# Patient Record
Sex: FEMALE | Race: WHITE | NOT HISPANIC OR LATINO | Employment: OTHER | URBAN - METROPOLITAN AREA
[De-identification: names, ages, dates, MRNs, and addresses within clinical notes are randomized per-mention and may not be internally consistent; named-entity substitution may affect disease eponyms.]

---

## 2017-06-16 ENCOUNTER — ALLSCRIPTS OFFICE VISIT (OUTPATIENT)
Dept: OTHER | Facility: OTHER | Age: 72
End: 2017-06-16

## 2017-12-15 ENCOUNTER — ALLSCRIPTS OFFICE VISIT (OUTPATIENT)
Dept: OTHER | Facility: OTHER | Age: 72
End: 2017-12-15

## 2017-12-15 DIAGNOSIS — M06.9 RHEUMATOID ARTHRITIS (HCC): ICD-10-CM

## 2017-12-30 NOTE — PROGRESS NOTES
Assessment   1  Postmenopausal vaginal bleeding (627 1) (N95 0)   2  Presence of pessary (V45 89) (Z92 89)   3  Rheumatoid arthritis (714 0) (M06 9)    Plan   Colon cancer screening    · (1) OCCULT BLOOD, FECAL IMMUNOCHEMICAL TEST; Status:Active; Requested    for:92Fkg6831; Health Maintenance    · Stretch and warm up your muscles during the first 10 minutes , then cool down your    muscles for the last 10 minutes of exercise ; Status:Complete;   Done: [id number]  Left wrist pain    · TraMADol HCl - 50 MG Oral Tablet; TAKE 1 TO 2 TABLETS 3 TIMES DAILY AS    NEEDED  Postmenopausal vaginal bleeding, Presence of pessary    · 1 - Nadeem RIVERA, Shantell Jones  (Obstetrics/Gynecology) Co-Management  *  Status: Active     Requested for: 02TLG1080  Care Summary provided  : Yes  Rheumatoid arthritis    · * DXA BONE DENSITY SPINE HIP AND PELVIS; Status:Active; Requested for:40Pqs7822;        Discussion/Summary      6-8 mo f/u  The patient was counseled regarding instructions for management,-- patient and family education,-- impressions  The treatment plan was reviewed with the patient/guardian  The patient/guardian understands and agrees with the treatment plan      Chief Complaint   Pt is here for a check up  History of Present Illness   here for scheduled OV, doing well saw ortho at [facility] for her wrist in september, supposed to be referred elsewhere, but they never got back to me      Review of Systems        Constitutional: No fever, no chills, feels well, no tiredness, no recent weight gain or weight loss  Cardiovascular: No complaints of slow heart rate, no fast heart rate, no chest pain, no palpitations, no leg claudication, no lower extremity edema  Respiratory: No complaints of shortness of breath, no wheezing, no cough, no SOB on exertion, no orthopnea, no PND  Gastrointestinal: No complaints of abdominal pain, no constipation, no nausea or vomiting, no diarrhea, no bloody stools        Genitourinary: today states still has pessary in from many years ago, sometimes bleeds a little, but-- no dysuria,-- no pelvic pain,-- no vaginal discharge-- and-- no incontinence  Musculoskeletal: as noted in HPI  Integumentary: No complaints of skin rash or lesions, no itching, no skin wounds, no breast pain or lump  Neurological: No complaints of headache, no confusion, no convulsions, no numbness, no dizziness or fainting, no tingling, no limb weakness, no difficulty walking  Hematologic/Lymphatic: No complaints of swollen glands, no swollen glands in the neck, does not bleed easily, does not bruise easily  Active Problems   1  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)   2  Encounter for special screening examination for genitourinary disorder (V81 6) (Z13 89)   3  Female bladder prolapse (618 01) (N81 10)   4  [facility] (hard of hearing) (389 9) (H91 90)   5  Left wrist pain (719 43) (M25 532)   6  Medicare annual wellness visit, initial (V70 0) (Z00 00)   7  Need for influenza vaccination (V04 81) (Z23)   8  Need for prophylactic antibiotic (V58 62) (Z79 2)   9  Rheumatoid arthritis (714 0) (M06 9)   10  Thyroid disorder (246 9) (E07 9)    Past Medical History   1  History of Cat bite (879 8,E906 3) (W55 01XA)   2  History of fall (V15 88) (Z91 81)   3  History of Lyme disease (V12 09) (Z86 19)     The active problems and past medical history were reviewed and updated today  Surgical History   1  History of  Section   2  History of Thyroid Surgery   3  History of Total Knee Replacement Left   4  History of Total Knee Replacement Right     The surgical history was reviewed and updated today  Family History   Mother    1  Family history of    2  Family history of arthritis (V17 7) (Z82 61)   3  Family history of hypertension (V17 49) (Z82 49)   4  Family history of malignant neoplasm (V16 9) (Z80 9)   5  Family history of osteoporosis (V17 81) (Z82 62)  Father    6   Family history of   Son    7  Family history of arthritis (V17 7) (Z82 61)   8  Family history of leukemia (V16 6) (Z80 6)  Sister    5  Family history of arthritis (V17 7) (Z82 61)   10  Family history of osteoporosis (V17 81) (Z82 62)   11  Family history of rheumatoid arthritis (V17 7) (Z82 61)  Aunt    12  Family history of malignant neoplasm (V16 9) (Z80 9)     The family history was reviewed and updated today  Social History    · Always uses seat belt   ·    · Never a smoker   · No alcohol use   · No drug use   · Passive smoke exposure (V15 89) (Z77 22)   · Retired   · Two children   · Work history  The social history was reviewed and updated today  The social history was reviewed and is unchanged  Current Meds    1  Esomeprazole Magnesium 40 MG Oral Capsule Delayed Release; take 1 capsule once     daily; Therapy: 27CDH8809 to (Evaluate:2018)  Requested for: 49Qls6305; Last     Rx:[id number] Ordered   2  Ibuprofen 200 MG Oral Capsule; Therapy: (Recorded:63Xwj8707) to Recorded     The medication list was reviewed and updated today  Allergies   1  No Known Drug Allergies    Vitals   Vital Signs    Recorded: 57Rdn7459 11:18AM   Temperature 98 3 F   Heart Rate 87   Respiration 16   Systolic 120   Diastolic 70   Height 5 ft 0 63 in   Weight 121 lb 4 oz   BMI Calculated 23 19   BSA Calculated 1 52   O2 Saturation 96     Physical Exam        Constitutional      General appearance: No acute distress, well appearing and well nourished  appears younger than stated age  Eyes      Conjunctiva and lids: No swelling, erythema or discharge  Pupils and irises: Equal, round and reactive to light  Ears, Nose, Mouth, and Throat      Oropharynx: Normal with no erythema, edema, exudate or lesions  Pulmonary      Respiratory effort: No increased work of breathing or signs of respiratory distress  Auscultation of lungs: Clear to auscultation         Cardiovascular Auscultation of heart: Normal rate and rhythm, normal S1 and S2, without murmurs  Examination of extremities for edema and/or varicosities: Normal        Abdomen      Abdomen: Non-tender, no masses  Liver and spleen: No hepatomegaly or splenomegaly  Musculoskeletal      Gait and station: Normal        Skin      Skin and subcutaneous tissue: Normal without rashes or lesions         Psychiatric      Mood and affect: Normal           Signatures    Electronically signed by : Blanca Fernandes DO; Dec 29 2017 10:31AM EST                       (Author)

## 2018-01-12 VITALS
SYSTOLIC BLOOD PRESSURE: 130 MMHG | HEIGHT: 61 IN | OXYGEN SATURATION: 98 % | WEIGHT: 119.38 LBS | HEART RATE: 79 BPM | TEMPERATURE: 98.1 F | DIASTOLIC BLOOD PRESSURE: 80 MMHG | RESPIRATION RATE: 16 BRPM | BODY MASS INDEX: 22.54 KG/M2

## 2018-01-23 VITALS
HEIGHT: 61 IN | SYSTOLIC BLOOD PRESSURE: 130 MMHG | TEMPERATURE: 98.3 F | HEART RATE: 87 BPM | OXYGEN SATURATION: 96 % | BODY MASS INDEX: 22.89 KG/M2 | WEIGHT: 121.25 LBS | DIASTOLIC BLOOD PRESSURE: 70 MMHG | RESPIRATION RATE: 16 BRPM

## 2018-03-06 ENCOUNTER — APPOINTMENT (OUTPATIENT)
Dept: RADIOLOGY | Facility: CLINIC | Age: 73
End: 2018-03-06
Payer: MEDICARE

## 2018-03-06 ENCOUNTER — OFFICE VISIT (OUTPATIENT)
Dept: URGENT CARE | Facility: CLINIC | Age: 73
End: 2018-03-06
Payer: MEDICARE

## 2018-03-06 VITALS
TEMPERATURE: 97.3 F | RESPIRATION RATE: 16 BRPM | OXYGEN SATURATION: 98 % | DIASTOLIC BLOOD PRESSURE: 67 MMHG | SYSTOLIC BLOOD PRESSURE: 112 MMHG

## 2018-03-06 DIAGNOSIS — J40 BRONCHITIS: Primary | ICD-10-CM

## 2018-03-06 PROCEDURE — G0463 HOSPITAL OUTPT CLINIC VISIT: HCPCS | Performed by: FAMILY MEDICINE

## 2018-03-06 PROCEDURE — 99203 OFFICE O/P NEW LOW 30 MIN: CPT | Performed by: FAMILY MEDICINE

## 2018-03-06 PROCEDURE — 71046 X-RAY EXAM CHEST 2 VIEWS: CPT

## 2018-03-06 RX ORDER — ESOMEPRAZOLE MAGNESIUM 40 MG/1
1 CAPSULE, DELAYED RELEASE ORAL 2 TIMES DAILY PRN
COMMUNITY
Start: 2017-05-20 | End: 2018-05-18 | Stop reason: SDUPTHER

## 2018-03-06 RX ORDER — AZITHROMYCIN 250 MG/1
TABLET, FILM COATED ORAL
Qty: 5 TABLET | Refills: 0 | Status: SHIPPED | OUTPATIENT
Start: 2018-03-06 | End: 2018-03-10

## 2018-03-06 RX ORDER — ALBUTEROL SULFATE 90 UG/1
2 AEROSOL, METERED RESPIRATORY (INHALATION) EVERY 4 HOURS PRN
Qty: 1 INHALER | Refills: 0 | Status: SHIPPED | OUTPATIENT
Start: 2018-03-06 | End: 2018-04-05

## 2018-03-06 RX ORDER — TRAMADOL HYDROCHLORIDE 50 MG/1
2 TABLET ORAL 3 TIMES DAILY PRN
COMMUNITY
Start: 2017-06-16 | End: 2018-03-23 | Stop reason: SDUPTHER

## 2018-03-06 NOTE — PROGRESS NOTES
[facility] [employer] [facility] - Patient Visit Note  Jorge Alberto Meier 67 y o  female MRN: [id number]      Assessment / Plan:       Bronchitis [J40]  1  Bronchitis  XR chest pa & lateral       Reason For Visit / Chief Complaint  Chief Complaint   Patient presents with    Cough     x 3 days    Cold Like Symptoms    Fatigue    Fever    Extremity Weakness    Sore Throat          Lajean Cassette Discussion:  Discussed the chest x-ray preliminary report with the patient at this time  There is no obvious pneumonia  This was confirmed by Dr Mohan Rosario, radiologist at [facility]      HPI:  Jorge Alberto Meier is a 67 y o  female            Patient presents with symptoms of extreme cough producing small amount of yellow sputum since Thursday of last week  She states many friends told her she had a high fever or this was not documented is [employer]  Yesterday she had loose bowel movement x1 cough so much that she actually vomited  Sputum has become more yellow green in the past 2 days she has generalized myalgias and occasionally is dizzy when getting to an upright position  Historical Information   No past medical history on file  No past surgical history on file  Social History   History   Alcohol use Not on file     History   Drug use: Unknown     History   Smoking Status    Not on file   Smokeless Tobacco    Not on file     No family history on file      Meds/Allergies   No Known Allergies    Meds:    Current Outpatient Prescriptions:     esomeprazole (NexIUM) 40 MG capsule, Take 1 capsule by mouth daily, Disp: , Rfl:     traMADol (ULTRAM) 50 mg tablet, Take 2 tablets by mouth 3 (three) times a day as needed, Disp: , Rfl:       REVIEW OF SYSTEMS  Constitutional: positive for fatigue and fevers  Eyes: negative  Ears, nose, mouth, throat, and face: negative  Respiratory: positive for cough  Cardiovascular: negative          Current Vitals:   Blood Pressure: 112/67 (03/06/18 1515)  Temperature: (!) 97 3 °F (36 3 °C) (03/06/18 1515)  Respirations: 16 (03/06/18 1515)  SpO2: 98 % (03/06/18 1515)  /67   Temp (!) 97 3 °F (36 3 °C)   Resp 16   SpO2 98%     PHYSICAL EXAM:         General Appearance:    Alert, cooperative, no apparent distress, appears stated age     Oriented x3    Head:    Normocephalic, without obvious abnormality, atraumatic   Eyes:      EOM's intact,      ROLAND,        conjunctiva/corneas clear,          fundi not visualized well   Ears:     Normal external ear canals     Tm right side  Normal     Tm left side    Normal       Nose:   Nares normal externally, septum midline,     mucosa normal,     No anterior drainage         Sinuses   with out   tenderness to palpation / percussion     Throat:   Lips, mucosa, and tongue normal       Anterior pharynx   Normal      Posterior pharynx   Normal      No exudate obvious       Neck:   Supple, symmetrical, trachea midline and moveable    Normal thyroid click present    No carotid bruits appreciated        Lymphatics:     Adenopathy in anterior cervical chain  Normal    Adenopathy in posterior cervical chain   Normal     Lungs:   Bilateral inspiratory and expiratory wheezes are noted at both bases and mid lung fields there are rales at the right mid lung with occasional inspiratory rhonchi     Heart[de-identified]    Regular rate and rhythm, S1 and S2 normal,     No S3, S4, audible    No murmurs, rubs      Extremities:     Extremities grossly normal     atraumatic,     no cyanosis or edema        Skin:     Skin color, texture, turgor normal, no rashes or lesions                           Follow up at primary care in 2  days    Counseling / Coordination of Care  Total floor / unit time spent today 15 minutes    Greater than 50% of total time was spent with the patient and / or family counseling and / or coordination of care  Portions of the record may have been created with voice recognition software   Occasional wrong word or "sound a like" substitutions may have occurred due to the inherent limitations of voice recognition software   Read the chart carefully and recognize, using context, where substitutions have occurred

## 2018-03-06 NOTE — PATIENT INSTRUCTIONS
Acute Bronchitis   WHAT YOU NEED TO KNOW:   Acute bronchitis is swelling and irritation in the air passages of your lungs  This irritation may cause you to cough or have other breathing problems  Acute bronchitis often starts because of another illness, such as a cold or the flu  The illness spreads from your nose and throat to your windpipe and airways  Bronchitis is often called a chest cold  Acute bronchitis lasts about 3 to 6 weeks and is usually not a serious illness  Your cough can last for several weeks  DISCHARGE INSTRUCTIONS:   Return to the emergency department if:   · You cough up blood  · Your lips or fingernails turn blue  · You feel like you are not getting enough air when you breathe  Contact your healthcare provider if:   · You have a fever  · Your breathing problems do not go away or get worse  · Your cough does not get better within 4 weeks  · You have questions or concerns about your condition or care  Self-care:   · Get more rest   Rest helps your body to heal  Slowly start to do more each day  Rest when you feel it is needed  · Avoid irritants in the air  Avoid chemicals, fumes, and dust  Wear a face mask if you must work around dust or fumes  Stay inside on days when air pollution levels are high  If you have allergies, stay inside when pollen counts are high  Do not use aerosol products, such as spray-on deodorant, bug spray, and hair spray  · Do not smoke or be around others who smoke  Nicotine and other chemicals in cigarettes and cigars damages the cilia that move mucus out of your lungs  Ask your healthcare provider for information if you currently smoke and need help to quit  E-cigarettes or smokeless tobacco still contain nicotine  Talk to your healthcare provider before you use these products  · Drink liquids as directed  Liquids help keep your air passages moist and help you cough up mucus   You may need to drink more liquids when you have acute bronchitis  Ask how much liquid to drink each day and which liquids are best for you  · Use a humidifier or vaporizer  Use a cool mist humidifier or a vaporizer to increase air moisture in your home  This may make it easier for you to breathe and help decrease your cough  Decrease risk for acute bronchitis:   · Get the vaccinations you need  Ask your healthcare provider if you should get vaccinated against the flu or pneumonia  · Prevent the spread of germs  You can decrease your risk of acute bronchitis and other illnesses by doing the following:     [facility] your hands often with soap and water  Carry germ-killing hand lotion or gel with you  You can use the lotion or gel to clean your hands when soap and water are not available  ¨ Do not touch your eyes, nose, or mouth unless you have washed your hands first     ¨ Always cover your mouth when you cough to prevent the spread of germs  It is best to cough into a tissue or your shirt sleeve instead of into your hand  Ask those around you cover their mouths when they cough  ¨ Try to avoid people who have a cold or the flu  If you are sick, stay away from others as much as possible  Medicines: Your healthcare provider may  give you any of the following:  · Ibuprofen or acetaminophen  are medicines that help lower your fever  They are available without a doctor's order  Ask your healthcare provider which medicine is right for you  Ask how much to take and how often to take it  Follow directions  These medicines can cause stomach bleeding if not taken correctly  Ibuprofen can cause kidney damage  Do not take ibuprofen if you have kidney disease, an ulcer, or allergies to aspirin  Acetaminophen can cause liver damage  Do not take more than 4,000 milligrams in 24 hours  · Decongestants  help loosen mucus in your lungs and make it easier to cough up  This can help you breathe easier  · Cough suppressants  decrease your urge to cough   If your cough produces mucus, do not take a cough suppressant unless your healthcare provider tells you to  Your healthcare provider may suggest that you take a cough suppressant at night so you can rest     · Inhalers  may be given  Your healthcare provider may give you one or more inhalers to help you breathe easier and cough less  An inhaler gives your medicine to open your airways  Ask your healthcare provider to show you how to use your inhaler correctly  · Take your medicine as directed  Contact your healthcare provider if you think your medicine is not helping or if you have side effects  Tell him of her if you are allergic to any medicine  Keep a list of the medicines, vitamins, and herbs you take  Include the amounts, and when and why you take them  Bring the list or the pill bottles to follow-up visits  Carry your medicine list with you in case of an emergency  Follow up with your healthcare provider as directed:  Write down questions you have so you will remember to ask them during your follow-up visits  © 2017 [employer] Information is for End User's use only and may not be sold, redistributed or otherwise used for commercial purposes  All illustrations and images included in CareNotes® are the copyrighted property of [employer]  or Bennie Duran  The above information is an  only  It is not intended as medical advice for individual conditions or treatments  Talk to your doctor, nurse or pharmacist before following any medical regimen to see if it is safe and effective for you

## 2018-03-23 DIAGNOSIS — M19.90 ARTHRITIS: Primary | ICD-10-CM

## 2018-03-24 RX ORDER — TRAMADOL HYDROCHLORIDE 50 MG/1
100 TABLET ORAL 3 TIMES DAILY PRN
Qty: 30 TABLET | Refills: 0 | Status: SHIPPED | OUTPATIENT
Start: 2018-03-24 | End: 2018-06-06 | Stop reason: SDUPTHER

## 2018-03-27 ENCOUNTER — OFFICE VISIT (OUTPATIENT)
Dept: URGENT CARE | Facility: CLINIC | Age: 73
End: 2018-03-27
Payer: MEDICARE

## 2018-03-27 ENCOUNTER — APPOINTMENT (OUTPATIENT)
Dept: RADIOLOGY | Facility: CLINIC | Age: 73
End: 2018-03-27
Payer: MEDICARE

## 2018-03-27 VITALS
DIASTOLIC BLOOD PRESSURE: 73 MMHG | HEART RATE: 77 BPM | TEMPERATURE: 97.3 F | SYSTOLIC BLOOD PRESSURE: 130 MMHG | OXYGEN SATURATION: 100 %

## 2018-03-27 DIAGNOSIS — R05.9 COUGH: ICD-10-CM

## 2018-03-27 DIAGNOSIS — J20.9 ACUTE BRONCHITIS, UNSPECIFIED ORGANISM: Primary | ICD-10-CM

## 2018-03-27 PROCEDURE — 99214 OFFICE O/P EST MOD 30 MIN: CPT | Performed by: PHYSICIAN ASSISTANT

## 2018-03-27 PROCEDURE — G0463 HOSPITAL OUTPT CLINIC VISIT: HCPCS | Performed by: PHYSICIAN ASSISTANT

## 2018-03-27 PROCEDURE — 71046 X-RAY EXAM CHEST 2 VIEWS: CPT

## 2018-03-27 RX ORDER — DOXYCYCLINE HYCLATE 100 MG/1
100 CAPSULE ORAL EVERY 12 HOURS SCHEDULED
Qty: 20 CAPSULE | Refills: 0 | Status: SHIPPED | OUTPATIENT
Start: 2018-03-27 | End: 2018-04-06

## 2018-03-27 RX ORDER — PREDNISONE 10 MG/1
TABLET ORAL
Qty: 26 TABLET | Refills: 0 | Status: SHIPPED | OUTPATIENT
Start: 2018-03-27 | End: 2020-08-05

## 2018-03-27 NOTE — PATIENT INSTRUCTIONS
I have prescribed an antibiotic for the infection  Please take the antibiotic as prescribed and finish the entire prescription  I recommend that the patient takes an over the counter probiotic or eats yogurt with live cultures in it [place]) to keep good bacteria in the gut and help prevent diarrhea  Wash hands frequently to prevent the spread of infection  Can use over the counter cough and cold medications to help with symptoms  Follow up with PCP in the next week

## 2018-03-27 NOTE — PROGRESS NOTES
[facility] [facility]    NAME: Ginger Short is a 67 y o  female  : 1945    MRN: [id number]  DATE: 2018  TIME: 5:12 PM    Assessment and Plan   Acute bronchitis, unspecified organism [J20 9]  1  Acute bronchitis, unspecified organism  doxycycline hyclate (VIBRAMYCIN) 100 mg capsule    predniSONE 10 mg tablet   2  Cough  XR chest pa & lateral       Patient Instructions   Patient Instructions   I have prescribed an antibiotic for the infection  Please take the antibiotic as prescribed and finish the entire prescription  I recommend that the patient takes an over the counter probiotic or eats yogurt with live cultures in it [place]) to keep good bacteria in the gut and help prevent diarrhea  Wash hands frequently to prevent the spread of infection  Can use over the counter cough and cold medications to help with symptoms  Follow up with PCP in the next week  Chief Complaint     Chief Complaint   Patient presents with    Cold Like Symptoms     chest congestion has not disappeared since last x-ray 3/6  Did not con't inhaler       History of Present Illness   67year old female here with ongoing chest congestion and cough for the last 3 weeks  Patient states that cough is productive with a clear and yellow sputum  She was treated with an antibiotic and albuterol inhaler which she feels did not help at all  She is not a smoker  She states that she has never had anything like this before  Denies any nasal congestion, fever chills  Review of Systems   Review of Systems   Constitutional: Negative for activity change, appetite change, chills, diaphoresis, fatigue, fever and unexpected weight change  HENT: Negative for congestion, dental problem, hearing loss, sinus pressure, sneezing, sore throat, tinnitus, trouble swallowing and voice change  Eyes: Negative for photophobia, redness and visual disturbance  Respiratory: Positive for cough (Chest congestion)   Negative for apnea, chest tightness, shortness of breath, wheezing and stridor  Cardiovascular: Negative for chest pain, palpitations and leg swelling  Gastrointestinal: Negative for abdominal distention, abdominal pain, blood in stool, constipation, diarrhea, nausea and vomiting  Endocrine: Negative for cold intolerance, heat intolerance, polydipsia, polyphagia and polyuria  Genitourinary: Negative for difficulty urinating, dysuria, flank pain, frequency, hematuria and urgency  Musculoskeletal: Negative for arthralgias, back pain, gait problem, joint swelling, myalgias, neck pain and neck stiffness  Skin: Negative for pallor, rash and wound  Neurological: Negative for dizziness, tremors, seizures, speech difficulty, weakness and headaches  Hematological: Negative for adenopathy  Does not bruise/bleed easily  Psychiatric/Behavioral: Negative for agitation, confusion, dysphoric mood and sleep disturbance  The patient is not nervous/anxious  All other systems reviewed and are negative        Current Medications     Current Outpatient Prescriptions:     esomeprazole (NexIUM) 40 MG capsule, Take 1 capsule by mouth 2 (two) times a day as needed  , Disp: , Rfl:     traMADol (ULTRAM) 50 mg tablet, Take 2 tablets (100 mg total) by mouth 3 (three) times a day as needed for moderate pain or severe pain, Disp: 30 tablet, Rfl: 0    albuterol (PROVENTIL HFA,VENTOLIN HFA) 90 mcg/act inhaler, Inhale 2 puffs every 4 (four) hours as needed for wheezing or shortness of breath for up to 30 days, Disp: 1 Inhaler, Rfl: 0    doxycycline hyclate (VIBRAMYCIN) 100 mg capsule, Take 1 capsule (100 mg total) by mouth every 12 (twelve) hours for 10 days, Disp: 20 capsule, Rfl: 0    predniSONE 10 mg tablet, Take 3 tabs BID X 2 days, 2 tabs BID X 2 days, 1 tab BID X 2 days, 1 tab daily X 2 days, Disp: 26 tablet, Rfl: 0    Current Allergies     Allergies as of 03/27/2018    (No Known Allergies)          The following portions of the patient's history were reviewed and updated as appropriate: allergies, current medications, past family history, past medical history, past social history, past surgical history and problem list      Objective   /73   Pulse 77   Temp (!) 97 3 °F (36 3 °C)   SpO2 100%      Physical Exam   Physical Exam   Constitutional: She appears well-developed and well-nourished  No distress  HENT:   Head: Normocephalic  Right Ear: External ear normal    Left Ear: External ear normal    Nose: Nose normal    Mouth/Throat: Oropharynx is clear and moist  No oropharyngeal exudate  Neck: Normal range of motion  Neck supple  Cardiovascular: Normal rate, regular rhythm and normal heart sounds  No murmur heard  Pulmonary/Chest: Effort normal  No respiratory distress  She has no wheezes  She has rales  Abdominal: Soft  Bowel sounds are normal  There is no tenderness  Musculoskeletal: Normal range of motion  Lymphadenopathy:     She has no cervical adenopathy  Skin: Skin is warm  No rash noted

## 2018-05-18 DIAGNOSIS — K21.9 GASTROESOPHAGEAL REFLUX DISEASE WITHOUT ESOPHAGITIS: Primary | ICD-10-CM

## 2018-05-20 RX ORDER — ESOMEPRAZOLE MAGNESIUM 40 MG/1
CAPSULE, DELAYED RELEASE ORAL
Qty: 30 CAPSULE | Refills: 3 | Status: SHIPPED | OUTPATIENT
Start: 2018-05-20 | End: 2018-09-14 | Stop reason: SDUPTHER

## 2018-06-06 DIAGNOSIS — M19.90 ARTHRITIS: ICD-10-CM

## 2018-06-06 RX ORDER — TRAMADOL HYDROCHLORIDE 50 MG/1
100 TABLET ORAL 3 TIMES DAILY PRN
Qty: 90 TABLET | Refills: 0 | Status: SHIPPED | OUTPATIENT
Start: 2018-06-06 | End: 2018-07-09 | Stop reason: SDUPTHER

## 2018-07-09 DIAGNOSIS — M19.90 ARTHRITIS: ICD-10-CM

## 2018-07-12 RX ORDER — TRAMADOL HYDROCHLORIDE 50 MG/1
100 TABLET ORAL 3 TIMES DAILY PRN
Qty: 90 TABLET | Refills: 0 | Status: SHIPPED | OUTPATIENT
Start: 2018-07-12 | End: 2018-09-14 | Stop reason: SDUPTHER

## 2018-08-24 ENCOUNTER — TELEPHONE (OUTPATIENT)
Dept: FAMILY MEDICINE CLINIC | Facility: CLINIC | Age: 73
End: 2018-08-24

## 2018-08-24 NOTE — TELEPHONE ENCOUNTER
Patient left message on [facility] 8/24/18 1:53 pm for refill of Tramadol  She requests 90 day refill  Please call when it's ready  Thank you

## 2018-09-14 ENCOUNTER — OFFICE VISIT (OUTPATIENT)
Dept: FAMILY MEDICINE CLINIC | Facility: CLINIC | Age: 73
End: 2018-09-14
Payer: COMMERCIAL

## 2018-09-14 VITALS
DIASTOLIC BLOOD PRESSURE: 90 MMHG | SYSTOLIC BLOOD PRESSURE: 138 MMHG | OXYGEN SATURATION: 96 % | TEMPERATURE: 98.7 F | BODY MASS INDEX: 22.37 KG/M2 | WEIGHT: 118.5 LBS | HEART RATE: 87 BPM | HEIGHT: 61 IN

## 2018-09-14 DIAGNOSIS — E07.9 THYROID DISORDER: ICD-10-CM

## 2018-09-14 DIAGNOSIS — Z13.220 LIPID SCREENING: ICD-10-CM

## 2018-09-14 DIAGNOSIS — Z53.20 COLONOSCOPY REFUSED: ICD-10-CM

## 2018-09-14 DIAGNOSIS — M21.932: ICD-10-CM

## 2018-09-14 DIAGNOSIS — M25.532 LEFT WRIST PAIN: ICD-10-CM

## 2018-09-14 DIAGNOSIS — M19.90 ARTHRITIS: ICD-10-CM

## 2018-09-14 DIAGNOSIS — Z00.00 MEDICARE ANNUAL WELLNESS VISIT, SUBSEQUENT: Primary | ICD-10-CM

## 2018-09-14 DIAGNOSIS — M05.9 RHEUMATOID ARTHRITIS WITH POSITIVE RHEUMATOID FACTOR, INVOLVING UNSPECIFIED SITE (HCC): ICD-10-CM

## 2018-09-14 DIAGNOSIS — K21.9 GASTROESOPHAGEAL REFLUX DISEASE WITHOUT ESOPHAGITIS: ICD-10-CM

## 2018-09-14 DIAGNOSIS — Z11.59 NEED FOR HEPATITIS C SCREENING TEST: ICD-10-CM

## 2018-09-14 PROBLEM — M17.9 OSTEOARTHRITIS OF KNEE: Status: ACTIVE | Noted: 2018-09-14

## 2018-09-14 PROBLEM — M17.10 OSTEOARTHRITIS OF KNEE: Status: ACTIVE | Noted: 2018-09-14

## 2018-09-14 PROBLEM — H91.90 HOH (HARD OF HEARING): Status: ACTIVE | Noted: 2017-06-28

## 2018-09-14 PROBLEM — N95.0 POSTMENOPAUSAL VAGINAL BLEEDING: Status: ACTIVE | Noted: 2017-12-15

## 2018-09-14 PROCEDURE — 1160F RVW MEDS BY RX/DR IN RCRD: CPT | Performed by: FAMILY MEDICINE

## 2018-09-14 PROCEDURE — 3725F SCREEN DEPRESSION PERFORMED: CPT | Performed by: FAMILY MEDICINE

## 2018-09-14 PROCEDURE — 1125F AMNT PAIN NOTED PAIN PRSNT: CPT | Performed by: FAMILY MEDICINE

## 2018-09-14 PROCEDURE — 1036F TOBACCO NON-USER: CPT | Performed by: FAMILY MEDICINE

## 2018-09-14 PROCEDURE — 99214 OFFICE O/P EST MOD 30 MIN: CPT | Performed by: FAMILY MEDICINE

## 2018-09-14 PROCEDURE — G0439 PPPS, SUBSEQ VISIT: HCPCS | Performed by: FAMILY MEDICINE

## 2018-09-14 PROCEDURE — 1170F FXNL STATUS ASSESSED: CPT | Performed by: FAMILY MEDICINE

## 2018-09-14 PROCEDURE — 3008F BODY MASS INDEX DOCD: CPT | Performed by: FAMILY MEDICINE

## 2018-09-14 PROCEDURE — 1101F PT FALLS ASSESS-DOCD LE1/YR: CPT | Performed by: FAMILY MEDICINE

## 2018-09-14 RX ORDER — ESOMEPRAZOLE MAGNESIUM 40 MG/1
40 CAPSULE, DELAYED RELEASE ORAL DAILY
Qty: 90 CAPSULE | Refills: 1 | Status: SHIPPED | OUTPATIENT
Start: 2018-09-14 | End: 2019-03-15 | Stop reason: SDUPTHER

## 2018-09-14 RX ORDER — TRAMADOL HYDROCHLORIDE 50 MG/1
100 TABLET ORAL 3 TIMES DAILY PRN
Qty: 90 TABLET | Refills: 0 | Status: SHIPPED | OUTPATIENT
Start: 2018-09-14 | End: 2018-10-23 | Stop reason: SDUPTHER

## 2018-09-14 NOTE — PROGRESS NOTES
Assessment and Plan:    Problem List Items Addressed This Visit        Digestive    Gastroesophageal reflux disease without esophagitis    Relevant Medications    esomeprazole (NexIUM) 40 MG capsule       Endocrine    Thyroid disorder    Relevant Orders    CBC and differential (Completed)    Comprehensive metabolic panel (Completed)    UA w Reflex to Microscopic w Reflex to Culture -Lab Collect (Completed)    TSH, 3rd generation with Free T4 reflex (Completed)    Urine Microscopic (Completed)    Urine culture       Musculoskeletal and Integument    Arthritis    Relevant Medications    traMADol (ULTRAM) 50 mg tablet    Rheumatoid arthritis (HCC)    Relevant Orders    CBC and differential (Completed)    Comprehensive metabolic panel (Completed)    UA w Reflex to Microscopic w Reflex to Culture -Lab Collect (Completed)    Ambulatory referral to Orthopedic Surgery    Urine Microscopic (Completed)    Urine culture       Other    Deformity of left wrist joint    Relevant Orders    Ambulatory referral to Orthopedic Surgery    Left wrist pain    Relevant Orders    Ambulatory referral to Orthopedic Surgery    Colonoscopy refused    Medicare annual wellness visit, subsequent - Primary      Other Visit Diagnoses     Lipid screening        Relevant Orders    Lipid panel (Completed)    Need for hepatitis C screening test        Relevant Orders    Hepatitis C antibody        Health Maintenance Due   Topic Date Due    CRC Screening: Colonoscopy  1945         HPI:  Lisseth Bustos is a 68 y o  female here for her Subsequent Wellness Visit      Patient Active Problem List   Diagnosis    Arthritis    Deformity of left wrist joint    Female bladder prolapse    [place]hard [place] hearing)    Left wrist pain    Osteoarthritis of knee    Postmenopausal vaginal bleeding    Rheumatoid arthritis ([address] )    Thyroid disorder    Colonoscopy refused    Gastroesophageal reflux disease without esophagitis    Medicare annual wellness visit, subsequent     Past Medical History:   Diagnosis Date    Lyme disease      Past Surgical History:   Procedure Laterality Date     SECTION      REPLACEMENT TOTAL KNEE Left 2015    REPLACEMENT TOTAL KNEE Right     THYROID SURGERY      Age 21      Family History   Problem Relation Age of Onset    Arthritis Mother     Hypertension Mother     Cancer Mother     Osteoporosis Mother     Alzheimer's disease Father     Arthritis Sister     Osteoporosis Sister     Rheum arthritis Sister     Arthritis Son     Leukemia Son     Cancer Other      History   Smoking Status    Never Smoker   Smokeless Tobacco    Never Used     Comment: Passive Smoke exposure     History   Alcohol Use No      History   Drug Use No       Current Outpatient Prescriptions   Medication Sig Dispense Refill    esomeprazole (NexIUM) 40 MG capsule Take 1 capsule (40 mg total) by mouth daily 90 capsule 1    traMADol (ULTRAM) 50 mg tablet Take 2 tablets (100 mg total) by mouth 3 (three) times a day as needed for moderate pain or severe pain 90 tablet 0    predniSONE 10 mg tablet Take 3 tabs BID X 2 days, 2 tabs BID X 2 days, 1 tab BID X 2 days, 1 tab daily X 2 days (Patient not taking: Reported on 2018 ) 26 tablet 0     No current facility-administered medications for this visit  Allergies   Allergen Reactions    Codeine      There is no immunization history for the selected administration types on file for this patient  Patient Care Team:  Brian Daley DO as PCP - General    Medicare Screening Tests and Risk Assessments:  Price Tenorio is here for her Subsequent Wellness visit  Health Risk Assessment:  Patient rates overall health as very good  Patient feels that their physical health rating is Much better  Eyesight was rated as Same  Hearing was rated as Slightly worse  Patient feels that their emotional and mental health rating is Much better   Pain experienced by patient in the last 7 days has been Some  Patient's pain rating has been 5/10  Patient states that she has experienced no weight loss or gain in last 6 months  Emotional/Mental Health:  Patient has been feeling nervous/anxious  PHQ-9 Depression Screening:    Frequency of the following problems over the past two weeks:      1  Little interest or pleasure in doing things: 0 - not at all      2  Feeling down, depressed, or hopeless: 0 - not at all  PHQ-2 Score: 0          Broken Bones/Falls: Fall Risk Assessment:    In the past year, patient has experienced: No history of falling in past year          Bladder/Bowel:  Patient has not leaked urine accidently in the last six months  Patient reports no loss of bowel control  Immunizations:  Patient has not had a flu vaccination within the last year  Patient has not received a pneumonia shot  Patient has not received a shingles shot  Patient has received tetanus/diphtheria shot  (Additional Comments: Pt states that she had tetanus vaccine 3 years ago )    Home Safety:  Patient does not have trouble with stairs inside or outside of their home  Patient currently reports that there are no safety hazards present in home, working smoke alarms, no working carbon monoxide detectors  Preventative Screenings:   No breast cancer screening performed, no colon cancer screen completed, no cholesterol screen completed, no glaucoma eye exam completed    Nutrition:  Current diet: Low Carb and Limited junk food with servings of the following:    Medications:  Patient is not currently taking any over-the-counter supplements  Patient is able to manage medications  Lifestyle Choices:  Patient reports no tobacco use  Patient has not smoked or used tobacco in the past   Patient reports no alcohol use  Patient drives a vehicle  Patient wears seat belt      Current level of exercise of physical activity described by patient as: Cleans homes for others, Goes dancing on Fridays, Plays the drums, works outside  Activities of Daily Living:  Can get out of bed by his or her self, able to dress self, able to make own meals, able to do own shopping, able to bathe self, can do own laundry/housekeeping, can manage own money, pay bills and track expenses    Previous Hospitalizations:  No hospitalization or ED visit in past 12 months        Advanced Directives:  Patient has decided on a power of   Patient has spoken to designated power of   Patient has not completed advanced directive          Preventative Screening/Counseling:      Cardiovascular:      General: Screening Current          Diabetes:      General: Screening Current          Colorectal Cancer:      General: Patient Declines          Breast Cancer:      General: Patient Declines          Cervical Cancer:      General: Screening Not Indicated          Osteoporosis:      General: Patient Declines      Comments: Order had been given 12/2017        AAA:      General: Screening Not Indicated          Glaucoma:      General: Screening Current          HIV:      General: Screening Not Indicated          Hepatitis C:      General: Risks and Benefits Discussed

## 2018-09-14 NOTE — PROGRESS NOTES
Assessment/Plan:       Diagnoses and all orders for this visit:    Medicare annual wellness visit, subsequent    Arthritis  -     traMADol (ULTRAM) 50 mg tablet; Take 2 tablets (100 mg total) by mouth 3 (three) times a day as needed for moderate pain or severe pain    Gastroesophageal reflux disease without esophagitis  -     esomeprazole (NexIUM) 40 MG capsule; Take 1 capsule (40 mg total) by mouth daily    Colonoscopy refused    Deformity of left wrist joint  -     Ambulatory referral to Orthopedic Surgery; Future    Rheumatoid arthritis with positive rheumatoid factor, involving unspecified site (HCC)  -     CBC and differential; Future  -     Comprehensive metabolic panel; Future  -     UA w Reflex to Microscopic w Reflex to Culture -Lab Collect  -     Ambulatory referral to Orthopedic Surgery; Future  -     Urine Microscopic  -     Urine culture; Future  -     Urine culture    Left wrist pain  -     Ambulatory referral to Orthopedic Surgery; Future    Thyroid disorder  -     CBC and differential; Future  -     Comprehensive metabolic panel; Future  -     UA w Reflex to Microscopic w Reflex to Culture -Lab Collect  -     TSH, 3rd generation with Free T4 reflex; Future  -     Urine Microscopic  -     Urine culture; Future  -     Urine culture    Lipid screening  -     Lipid panel; Future    Need for hepatitis C screening test  -     Hepatitis C antibody; Future    Other orders  -     Cancel: Ambulatory referral to Gastroenterology; Future          Subjective:   Chief Complaint   Patient presents with    Follow-up     Pt is here for her 8 month f/u exam     Medicare Wellness Visit     Pt is here for her annual Medicare wellness visit  Patient ID: Donna Billingsley is a 68 y o  female      Per c/c reviewed by me- here for medicare AWV and for her routine f/u visit  No interval acute problems or illnesses  Reports that wrist is getting more deformed and painful- states did see orthopedist in past who "kind of sloughed me off," "getting harder to play the drums"        The following portions of the patient's history were reviewed and updated as appropriate: allergies, current medications, past family history, past medical history, past social history, past surgical history and problem list     Review of Systems   Constitutional: Negative  HENT: Negative  Eyes: Negative  Respiratory: Negative  Cardiovascular: Negative  Gastrointestinal: Negative  Endocrine: Negative  Genitourinary: Negative  Musculoskeletal: Negative for gait problem  Per hpi   Skin: Negative  Neurological: Negative  Hematological: Negative  Objective:      /90 (BP Location: Left arm, Patient Position: Sitting, Cuff Size: Standard)   Pulse 87   Temp 98 7 °F (37 1 °C) (Tympanic)   Ht 5' 1" (1 549 m)   Wt 53 8 kg (118 lb 8 oz)   SpO2 96%   BMI 22 39 kg/m²          Physical Exam   Constitutional: She is oriented to person, place, and time  She appears well-developed  She is cooperative  Non-toxic appearance  She does not have a sickly appearance  She does not appear ill  No distress  Appears younger than stated age   HENT:   Head: Normocephalic and atraumatic  Mouth/Throat: Uvula is midline, oropharynx is clear and moist and mucous membranes are normal    Eyes: Conjunctivae and lids are normal  Pupils are equal, round, and reactive to light  Neck: Trachea normal  Neck supple  Normal carotid pulses and no JVD present  Carotid bruit is not present  No thyroid mass and no thyromegaly present  Cardiovascular: Normal rate, regular rhythm, normal heart sounds and normal pulses  Pulmonary/Chest: Effort normal and breath sounds normal    Musculoskeletal:        Left wrist: She exhibits decreased range of motion, bony tenderness, crepitus and deformity  Lymphadenopathy:     She has no cervical adenopathy  Right: No supraclavicular adenopathy present          Left: No supraclavicular adenopathy present  Neurological: She is alert and oriented to person, place, and time  She has normal strength and normal reflexes  Gait normal    Skin: Skin is warm and dry  She is not diaphoretic  No cyanosis  No pallor  Psychiatric: She has a normal mood and affect  Her behavior is normal  Judgment normal    Nursing note and vitals reviewed

## 2018-09-24 ENCOUNTER — APPOINTMENT (OUTPATIENT)
Dept: LAB | Facility: CLINIC | Age: 73
End: 2018-09-24
Payer: COMMERCIAL

## 2018-09-24 DIAGNOSIS — Z13.220 LIPID SCREENING: ICD-10-CM

## 2018-09-24 DIAGNOSIS — M05.9 RHEUMATOID ARTHRITIS WITH POSITIVE RHEUMATOID FACTOR, INVOLVING UNSPECIFIED SITE (HCC): ICD-10-CM

## 2018-09-24 DIAGNOSIS — E07.9 THYROID DISORDER: ICD-10-CM

## 2018-09-24 PROBLEM — Z00.00 MEDICARE ANNUAL WELLNESS VISIT, SUBSEQUENT: Status: ACTIVE | Noted: 2018-09-24

## 2018-09-24 LAB
ALBUMIN SERPL BCP-MCNC: 3.2 G/DL (ref 3.5–5)
ALP SERPL-CCNC: 105 U/L (ref 46–116)
ALT SERPL W P-5'-P-CCNC: 16 U/L (ref 12–78)
ANION GAP SERPL CALCULATED.3IONS-SCNC: 5 MMOL/L (ref 4–13)
AST SERPL W P-5'-P-CCNC: 14 U/L (ref 5–45)
BACTERIA UR QL AUTO: ABNORMAL /HPF
BASOPHILS # BLD AUTO: 0.03 THOUSANDS/ΜL (ref 0–0.1)
BASOPHILS NFR BLD AUTO: 1 % (ref 0–1)
BILIRUB SERPL-MCNC: 0.84 MG/DL (ref 0.2–1)
BILIRUB UR QL STRIP: NEGATIVE
BUN SERPL-MCNC: 13 MG/DL (ref 5–25)
CALCIUM SERPL-MCNC: 8.5 MG/DL (ref 8.3–10.1)
CHLORIDE SERPL-SCNC: 109 MMOL/L (ref 100–108)
CHOLEST SERPL-MCNC: 177 MG/DL (ref 50–200)
CLARITY UR: ABNORMAL
CO2 SERPL-SCNC: 25 MMOL/L (ref 21–32)
COLOR UR: YELLOW
CREAT SERPL-MCNC: 0.6 MG/DL (ref 0.6–1.3)
EOSINOPHIL # BLD AUTO: 0.17 THOUSAND/ΜL (ref 0–0.61)
EOSINOPHIL NFR BLD AUTO: 3 % (ref 0–6)
ERYTHROCYTE [DISTWIDTH] IN BLOOD BY AUTOMATED COUNT: 13 % (ref 11.6–15.1)
GFR SERPL CREATININE-BSD FRML MDRD: 91 ML/MIN/1.73SQ M
GLUCOSE P FAST SERPL-MCNC: 86 MG/DL (ref 65–99)
GLUCOSE UR STRIP-MCNC: NEGATIVE MG/DL
HCT VFR BLD AUTO: 40.7 % (ref 34.8–46.1)
HDLC SERPL-MCNC: 36 MG/DL (ref 40–60)
HGB BLD-MCNC: 13.2 G/DL (ref 11.5–15.4)
HGB UR QL STRIP.AUTO: ABNORMAL
HYALINE CASTS #/AREA URNS LPF: ABNORMAL /LPF
IMM GRANULOCYTES # BLD AUTO: 0.01 THOUSAND/UL (ref 0–0.2)
IMM GRANULOCYTES NFR BLD AUTO: 0 % (ref 0–2)
KETONES UR STRIP-MCNC: NEGATIVE MG/DL
LDLC SERPL CALC-MCNC: 122 MG/DL (ref 0–100)
LEUKOCYTE ESTERASE UR QL STRIP: ABNORMAL
LYMPHOCYTES # BLD AUTO: 1.72 THOUSANDS/ΜL (ref 0.6–4.47)
LYMPHOCYTES NFR BLD AUTO: 30 % (ref 14–44)
MCH RBC QN AUTO: 28.1 PG (ref 26.8–34.3)
MCHC RBC AUTO-ENTMCNC: 32.4 G/DL (ref 31.4–37.4)
MCV RBC AUTO: 87 FL (ref 82–98)
MONOCYTES # BLD AUTO: 0.6 THOUSAND/ΜL (ref 0.17–1.22)
MONOCYTES NFR BLD AUTO: 10 % (ref 4–12)
NEUTROPHILS # BLD AUTO: 3.28 THOUSANDS/ΜL (ref 1.85–7.62)
NEUTS SEG NFR BLD AUTO: 56 % (ref 43–75)
NITRITE UR QL STRIP: NEGATIVE
NON-SQ EPI CELLS URNS QL MICRO: ABNORMAL /HPF
NONHDLC SERPL-MCNC: 141 MG/DL
NRBC BLD AUTO-RTO: 0 /100 WBCS
PH UR STRIP.AUTO: 6 [PH] (ref 4.5–8)
PLATELET # BLD AUTO: 334 THOUSANDS/UL (ref 149–390)
PMV BLD AUTO: 9.3 FL (ref 8.9–12.7)
POTASSIUM SERPL-SCNC: 3.4 MMOL/L (ref 3.5–5.3)
PROT SERPL-MCNC: 7.2 G/DL (ref 6.4–8.2)
PROT UR STRIP-MCNC: NEGATIVE MG/DL
RBC # BLD AUTO: 4.7 MILLION/UL (ref 3.81–5.12)
RBC #/AREA URNS AUTO: ABNORMAL /HPF
SODIUM SERPL-SCNC: 139 MMOL/L (ref 136–145)
SP GR UR STRIP.AUTO: 1.01 (ref 1–1.03)
TRIGL SERPL-MCNC: 94 MG/DL
TSH SERPL DL<=0.05 MIU/L-ACNC: 2.34 UIU/ML (ref 0.36–3.74)
UROBILINOGEN UR QL STRIP.AUTO: 0.2 E.U./DL
WBC # BLD AUTO: 5.81 THOUSAND/UL (ref 4.31–10.16)
WBC #/AREA URNS AUTO: ABNORMAL /HPF

## 2018-09-24 PROCEDURE — 87086 URINE CULTURE/COLONY COUNT: CPT | Performed by: FAMILY MEDICINE

## 2018-09-24 PROCEDURE — 87186 SC STD MICRODIL/AGAR DIL: CPT | Performed by: FAMILY MEDICINE

## 2018-09-24 PROCEDURE — 87077 CULTURE AEROBIC IDENTIFY: CPT | Performed by: FAMILY MEDICINE

## 2018-09-24 PROCEDURE — 80061 LIPID PANEL: CPT

## 2018-09-24 PROCEDURE — 80053 COMPREHEN METABOLIC PANEL: CPT

## 2018-09-24 PROCEDURE — 85025 COMPLETE CBC W/AUTO DIFF WBC: CPT

## 2018-09-24 PROCEDURE — 81001 URINALYSIS AUTO W/SCOPE: CPT | Performed by: FAMILY MEDICINE

## 2018-09-24 PROCEDURE — 36415 COLL VENOUS BLD VENIPUNCTURE: CPT

## 2018-09-24 PROCEDURE — 84443 ASSAY THYROID STIM HORMONE: CPT

## 2018-09-25 DIAGNOSIS — N39.0 URINARY TRACT INFECTION WITHOUT HEMATURIA, SITE UNSPECIFIED: Primary | ICD-10-CM

## 2018-09-25 RX ORDER — SULFAMETHOXAZOLE AND TRIMETHOPRIM 800; 160 MG/1; MG/1
1 TABLET ORAL EVERY 12 HOURS SCHEDULED
Qty: 14 TABLET | Refills: 0 | Status: SHIPPED | OUTPATIENT
Start: 2018-09-25 | End: 2018-10-02

## 2018-09-26 LAB — BACTERIA UR CULT: ABNORMAL

## 2018-10-01 ENCOUNTER — TELEPHONE (OUTPATIENT)
Dept: FAMILY MEDICINE CLINIC | Facility: CLINIC | Age: 73
End: 2018-10-01

## 2018-10-01 NOTE — TELEPHONE ENCOUNTER
Patient left message on office voice mail requesting that her "stomach pills" be called into [facility], as they won't give them to her  She did not say the name of the medication nor the dosage as she does not have the bottle on her  Thank you

## 2018-10-23 DIAGNOSIS — M19.90 ARTHRITIS: ICD-10-CM

## 2018-10-23 RX ORDER — TRAMADOL HYDROCHLORIDE 50 MG/1
100 TABLET ORAL 3 TIMES DAILY PRN
Qty: 90 TABLET | Refills: 0 | Status: SHIPPED | OUTPATIENT
Start: 2018-10-23 | End: 2018-12-04 | Stop reason: SDUPTHER

## 2018-12-04 DIAGNOSIS — M19.90 ARTHRITIS: ICD-10-CM

## 2018-12-05 RX ORDER — TRAMADOL HYDROCHLORIDE 50 MG/1
100 TABLET ORAL 3 TIMES DAILY PRN
Qty: 90 TABLET | Refills: 0 | Status: SHIPPED | OUTPATIENT
Start: 2018-12-05 | End: 2019-01-21 | Stop reason: SDUPTHER

## 2019-01-17 ENCOUNTER — OFFICE VISIT (OUTPATIENT)
Dept: FAMILY MEDICINE CLINIC | Facility: CLINIC | Age: 74
End: 2019-01-17
Payer: COMMERCIAL

## 2019-01-17 VITALS
SYSTOLIC BLOOD PRESSURE: 142 MMHG | HEART RATE: 88 BPM | WEIGHT: 114 LBS | OXYGEN SATURATION: 96 % | DIASTOLIC BLOOD PRESSURE: 86 MMHG | RESPIRATION RATE: 16 BRPM | HEIGHT: 60 IN | TEMPERATURE: 97.1 F | BODY MASS INDEX: 22.38 KG/M2

## 2019-01-17 DIAGNOSIS — R82.90 ABNORMAL URINE FINDING: ICD-10-CM

## 2019-01-17 DIAGNOSIS — R35.0 URINARY FREQUENCY: Primary | ICD-10-CM

## 2019-01-17 DIAGNOSIS — Z46.89 PESSARY MAINTENANCE: ICD-10-CM

## 2019-01-17 LAB
SL AMB  POCT GLUCOSE, UA: ABNORMAL
SL AMB LEUKOCYTE ESTERASE,UA: ABNORMAL
SL AMB POCT BILIRUBIN,UA: ABNORMAL
SL AMB POCT BLOOD,UA: ABNORMAL
SL AMB POCT CLARITY,UA: ABNORMAL
SL AMB POCT COLOR,UA: YELLOW
SL AMB POCT KETONES,UA: ABNORMAL
SL AMB POCT NITRITE,UA: ABNORMAL
SL AMB POCT PH,UA: 5
SL AMB POCT SPECIFIC GRAVITY,UA: 1.02
SL AMB POCT URINE PROTEIN: ABNORMAL
SL AMB POCT UROBILINOGEN: ABNORMAL

## 2019-01-17 PROCEDURE — 87086 URINE CULTURE/COLONY COUNT: CPT | Performed by: PHYSICIAN ASSISTANT

## 2019-01-17 PROCEDURE — 81002 URINALYSIS NONAUTO W/O SCOPE: CPT | Performed by: PHYSICIAN ASSISTANT

## 2019-01-17 PROCEDURE — 99213 OFFICE O/P EST LOW 20 MIN: CPT | Performed by: PHYSICIAN ASSISTANT

## 2019-01-17 PROCEDURE — 87077 CULTURE AEROBIC IDENTIFY: CPT | Performed by: PHYSICIAN ASSISTANT

## 2019-01-17 PROCEDURE — 87186 SC STD MICRODIL/AGAR DIL: CPT | Performed by: PHYSICIAN ASSISTANT

## 2019-01-17 RX ORDER — NITROFURANTOIN 25; 75 MG/1; MG/1
100 CAPSULE ORAL 2 TIMES DAILY
Qty: 10 CAPSULE | Refills: 0 | Status: SHIPPED | OUTPATIENT
Start: 2019-01-17 | End: 2019-01-22

## 2019-01-17 NOTE — PROGRESS NOTES
Assessment/Plan:      Diagnoses and all orders for this visit:    Urinary frequency  -     POCT urine dip  -     Urine culture  -     nitrofurantoin (MACROBID) 100 mg capsule; Take 1 capsule (100 mg total) by mouth 2 (two) times a day for 5 days    Abnormal urine finding  -     nitrofurantoin (MACROBID) 100 mg capsule; Take 1 capsule (100 mg total) by mouth 2 (two) times a day for 5 days  -     Urinalysis with microscopic; Future  -     Urine culture; Future; repeat urine in 2 weeks - ? Asymptomatic bacteruria     Pessary maintenance  -     Ambulatory referral to Gynecology; Future          Subjective:     Patient ID: Mel Pike is a 68 y o  female  Chief Complaint   Patient presents with    Urinary Tract Infection     HPI   Patient is a 69 yo female who presents with abnormal urine  She has had on and off burning so she picked up azo otc and knows she has a pessary so she picked up a urine sample check at the pharmacy and noticed it was abnormal  So she made an appointment to follow up and her urine sample is abnormal today  Currently she has no dysuria, urgency, abd pain, fever, chills, flank pain  Review of Systems   Constitutional: Negative for activity change, chills and fatigue  Respiratory: Negative  Cardiovascular: Negative  Gastrointestinal: Negative  Genitourinary: Negative for difficulty urinating, dysuria (now resolved but was having burning on and off which improved with azo) and hematuria  Pessary - she is unable to change and clean on her own      Skin: Negative  Neurological: Negative  Hematological: Negative  Objective:  Vitals:    01/17/19 1417   BP: 142/86   Pulse: 88   Resp: 16   Temp: (!) 97 1 °F (36 2 °C)   SpO2: 96%        Physical Exam   Constitutional: She is oriented to person, place, and time  She appears well-developed and well-nourished  No distress  Cardiovascular: Normal rate, regular rhythm, normal heart sounds and intact distal pulses  No murmur heard  Pulmonary/Chest: Effort normal and breath sounds normal  No respiratory distress  She has no wheezes  She has no rales  Abdominal: Soft  Bowel sounds are normal  She exhibits no distension  There is no tenderness  Neurological: She is alert and oriented to person, place, and time  No cranial nerve deficit  Skin: Skin is warm and dry  No rash noted  Psychiatric: She has a normal mood and affect   Her behavior is normal

## 2019-01-19 LAB — BACTERIA UR CULT: ABNORMAL

## 2019-01-21 DIAGNOSIS — M19.90 ARTHRITIS: ICD-10-CM

## 2019-01-23 RX ORDER — TRAMADOL HYDROCHLORIDE 50 MG/1
100 TABLET ORAL 3 TIMES DAILY PRN
Qty: 90 TABLET | Refills: 0 | Status: SHIPPED | OUTPATIENT
Start: 2019-01-23 | End: 2019-02-27 | Stop reason: SDUPTHER

## 2019-02-08 ENCOUNTER — OFFICE VISIT (OUTPATIENT)
Dept: OBGYN CLINIC | Facility: CLINIC | Age: 74
End: 2019-02-08
Payer: COMMERCIAL

## 2019-02-08 VITALS
BODY MASS INDEX: 23.93 KG/M2 | HEIGHT: 60 IN | WEIGHT: 121.9 LBS | SYSTOLIC BLOOD PRESSURE: 138 MMHG | DIASTOLIC BLOOD PRESSURE: 94 MMHG

## 2019-02-08 DIAGNOSIS — N76.5 VAGINAL MUCOUS MEMBRANE ULCERATION: ICD-10-CM

## 2019-02-08 DIAGNOSIS — Z46.89 PESSARY MAINTENANCE: ICD-10-CM

## 2019-02-08 DIAGNOSIS — N81.11 CYSTOCELE, MIDLINE: Primary | ICD-10-CM

## 2019-02-08 DIAGNOSIS — N93.9 VAGINAL BLEEDING: ICD-10-CM

## 2019-02-08 DIAGNOSIS — N95.1 VAGINAL DRYNESS, MENOPAUSAL: ICD-10-CM

## 2019-02-08 PROCEDURE — 99214 OFFICE O/P EST MOD 30 MIN: CPT | Performed by: OBSTETRICS & GYNECOLOGY

## 2019-02-27 DIAGNOSIS — M19.90 ARTHRITIS: ICD-10-CM

## 2019-03-02 RX ORDER — TRAMADOL HYDROCHLORIDE 50 MG/1
100 TABLET ORAL 3 TIMES DAILY PRN
Qty: 90 TABLET | Refills: 0 | Status: SHIPPED | OUTPATIENT
Start: 2019-03-02 | End: 2019-04-04 | Stop reason: SDUPTHER

## 2019-03-15 ENCOUNTER — OFFICE VISIT (OUTPATIENT)
Dept: FAMILY MEDICINE CLINIC | Facility: CLINIC | Age: 74
End: 2019-03-15
Payer: COMMERCIAL

## 2019-03-15 VITALS
SYSTOLIC BLOOD PRESSURE: 152 MMHG | RESPIRATION RATE: 16 BRPM | WEIGHT: 120 LBS | TEMPERATURE: 97.8 F | HEART RATE: 89 BPM | OXYGEN SATURATION: 98 % | DIASTOLIC BLOOD PRESSURE: 100 MMHG | BODY MASS INDEX: 23.44 KG/M2

## 2019-03-15 DIAGNOSIS — E78.5 HYPERLIPIDEMIA, UNSPECIFIED HYPERLIPIDEMIA TYPE: ICD-10-CM

## 2019-03-15 DIAGNOSIS — E89.0 HX OF PARTIAL THYROIDECTOMY: ICD-10-CM

## 2019-03-15 DIAGNOSIS — M05.9 RHEUMATOID ARTHRITIS WITH POSITIVE RHEUMATOID FACTOR, INVOLVING UNSPECIFIED SITE (HCC): Primary | ICD-10-CM

## 2019-03-15 DIAGNOSIS — E07.9 THYROID DISORDER: ICD-10-CM

## 2019-03-15 DIAGNOSIS — K21.9 GASTROESOPHAGEAL REFLUX DISEASE WITHOUT ESOPHAGITIS: ICD-10-CM

## 2019-03-15 DIAGNOSIS — R03.0 ELEVATED BLOOD PRESSURE READING WITHOUT DIAGNOSIS OF HYPERTENSION: ICD-10-CM

## 2019-03-15 DIAGNOSIS — R82.90 ABNORMAL URINE FINDING: ICD-10-CM

## 2019-03-15 PROBLEM — N39.0 URINARY TRACT INFECTION WITHOUT HEMATURIA: Status: RESOLVED | Noted: 2018-09-25 | Resolved: 2019-03-15

## 2019-03-15 PROCEDURE — 99214 OFFICE O/P EST MOD 30 MIN: CPT | Performed by: FAMILY MEDICINE

## 2019-03-15 RX ORDER — ESOMEPRAZOLE MAGNESIUM 40 MG/1
40 CAPSULE, DELAYED RELEASE ORAL DAILY
Qty: 90 CAPSULE | Refills: 1 | Status: SHIPPED | OUTPATIENT
Start: 2019-03-15 | End: 2019-09-03 | Stop reason: SDUPTHER

## 2019-03-15 NOTE — PROGRESS NOTES
Assessment/Plan:         Diagnoses and all orders for this visit:    Rheumatoid arthritis with positive rheumatoid factor, involving unspecified site [facility])  Comments:  pt still defers targeted meds  Orders:  -     Lipid panel; Future  -     Basic metabolic panel; Future  -     Vitamin D 25 hydroxy; Future    Elevated blood pressure reading without diagnosis of hypertension  Comments:  monitor with log  Orders:  -     Lipid panel; Future  -     Basic metabolic panel; Future    Gastroesophageal reflux disease without esophagitis  -     esomeprazole (NexIUM) 40 MG capsule; Take 1 capsule (40 mg total) by mouth daily    Thyroid disorder    Hx of partial thyroidectomy    Abnormal urine finding  Comments:  check routien U/A as f/u of +UIT in january and recent pessary removal  Orders:  -     UA w Reflex to Microscopic w Reflex to Culture -Lab Collect  -     Urine Microscopic    Hyperlipidemia, unspecified hyperlipidemia type  -     Lipid panel; Future    Other orders  -     FOLIC [id number] PO; Take by mouth          Subjective:   Chief Complaint   Patient presents with    Follow-up        Patient ID: Jorge Fox is a 68 y o  female  Here for routine f/u visit  Interval hx-Went to a GYN in [place] for the pessary that was put in 3 yrs ago, "I was ulcerated to where it was rubbing me raw and bleeding, seemed to heal by myself without the cream, and my bladder hasn't come out again, didn't want to go to specialist to put the sling in"  Has been single for many, many years, then for few months was dating a man -she just broke up with him, states she prefers single life   Is very active - goes dancing frequently, 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, I play the drums, I do everything"  "My knees are fine- they used to be the worst until Dr Jael Velazquez took care of them"      The following portions of the patient's history were reviewed and updated as appropriate: allergies, current medications, past family history, past medical history, past social history, past surgical history and problem list     Review of Systems   Constitutional: Negative  HENT: Negative for mouth sores, nosebleeds, sore throat and trouble swallowing  Per hpi   Eyes: Negative  Respiratory: Negative  Cardiovascular: Negative  Gastrointestinal: Negative  Endocrine: Negative  Genitourinary: Negative for decreased urine volume, difficulty urinating, dysuria, frequency and hematuria  Per hpi   Musculoskeletal:        Per hpi   Skin: Negative  Neurological: Negative  Hematological: Negative  Objective:      /100   Pulse 89   Temp 97 8 °F (36 6 °C)   Resp 16   Wt 54 4 kg (120 lb)   SpO2 98%   BMI 23 44 kg/m²          Physical Exam   Constitutional: She is oriented to person, place, and time  She appears well-developed  She is cooperative  Non-toxic appearance  She does not have a sickly appearance  She does not appear ill  No distress  Physically-fit, appears vibrant and healthy   HENT:   Head: Normocephalic and atraumatic  Mouth/Throat: Uvula is midline, oropharynx is clear and moist and mucous membranes are normal    Eyes: Pupils are equal, round, and reactive to light  Conjunctivae and lids are normal    Neck: Trachea normal  Neck supple  No JVD present  No thyroid mass and no thyromegaly present  Cardiovascular: Normal rate, regular rhythm, normal heart sounds and normal pulses  Pulmonary/Chest: Effort normal and breath sounds normal    Abdominal: Soft   Bowel sounds are normal  She exhibits no distension and no mass  There is no hepatosplenomegaly  There is no tenderness  Musculoskeletal:   Chronic, unchanged joint deformities b/l hands, wrists   Lymphadenopathy:     She has no cervical adenopathy  Right: No supraclavicular adenopathy present  Left: No supraclavicular adenopathy present  Neurological: She is alert and oriented to person, place, and time  She has normal reflexes  Gait normal    Skin: Skin is warm and dry  She is not diaphoretic  No cyanosis  No pallor  Psychiatric: She has a normal mood and affect  Her behavior is normal    Nursing note and vitals reviewed

## 2019-03-15 NOTE — PATIENT INSTRUCTIONS
Low-Sodium Diet   WHAT YOU NEED TO KNOW:   What is a low-sodium diet? A low-sodium diet limits foods that are high in sodium (salt)  You will need to follow a low-sodium diet if you have high blood pressure, kidney disease, or heart failure  You may also need to follow this diet if you have a condition that is causing your body to retain (hold) extra fluid  You may need to limit the amount of sodium you eat to 1,500 mg  Ask your healthcare provider how much sodium you can have each day  How can I use food labels to choose foods that are low in sodium? Read food labels to find the amount of sodium they contain  The amount of sodium is listed in milligrams (mg)  The % Daily Value (DV) column tells you how much of your daily needs are met by 1 serving of the food for each nutrient listed  Choose foods that have less than 5% of the DV of sodium  These foods are considered low in sodium  Foods that have 20% or more of the DV of sodium are considered high in sodium  Some food labels may also list any of the following terms that tell you about the sodium content in the food:  · Sodium-free:  Less than 5 mg in each serving    · Very low sodium:  35 mg of sodium or less in each serving    · Low sodium:  140 mg of sodium or less in each serving    · Reduced sodium: At least 25% less sodium in each serving than the regular type    · Light in sodium:  50% less sodium in each serving    · Unsalted or no added salt:  No extra salt is added during processing (the food may still contain sodium)  Which foods should I avoid? Salty foods are high in sodium   You should avoid the following:  · Processed foods:      ¨ Mixes for cornbread, biscuits, cake, and pudding     ¨ Instant foods, such as potatoes, cereals, noodles, and rice     ¨ Packaged foods, such as bread stuffing, rice and pasta mixes, snack dip mixes, and macaroni and cheese     ¨ Canned foods, such as canned vegetables, soups, broths, sauces, and vegetable or tomato juice    ¨ Snack foods, such as salted chips, popcorn, pretzels, pork rinds, salted crackers, and salted nuts    ¨ Frozen foods, such as dinners, entrees, vegetables with sauces, and breaded meats    ¨ Sauerkraut, pickled vegetables, and other foods prepared in brine    · Meats and cheeses:      ¨ Smoked or cured meat, such as corned beef, dawson, ham, hot dogs, and sausage    ¨ Canned meats or spreads, such as potted meats, sardines, anchovies, and imitation seafood    ¨ Deli or lunch meats, such as bologna, ham, turkey, and roast beef    ¨ Processed cheese, such as American cheese and cheese spreads    · Condiments, sauces, and seasonings:      ¨ Salt (¼ teaspoon of salt contains 575 mg of sodium)    ¨ Seasonings made with salt, such as garlic salt, celery salt, onion salt, and seasoned salt    ¨ Regular soy sauce, barbecue sauce, teriyaki sauce, steak sauce, Worcestershire sauce, and most flavored vinegars    ¨ Canned gravy and mixes     ¨ Regular condiments, such as mustard, ketchup, and salad dressings    ¨ Pickles and olives    ¨ Meat tenderizers and monosodium glutamate (MSG)  Which foods can I include? Read the food label to find the amount of sodium in each serving  · Bread and cereal:  Try to choose breads with less than 80 mg of sodium per serving  ¨ Bread, roll, kelli, tortilla, or unsalted crackers  ¨ Ready-to-eat cereals with less than 5% DV of sodium (examples include shredded wheat and puffed rice)    ¨ Pasta    · Vegetables and fruits:      ¨ Unsalted fresh, frozen, or canned vegetables    ¨ Fresh, frozen, or canned fruits    ¨ Fruit juice    · Dairy:  One serving has about 150 mg of sodium  ¨ Milk, all types    ¨ Yogurt    ¨ Hard cheese, such as cheddar, Swiss, [employer], or mozzarella    · Meat and other protein foods:  Some raw meats may have added sodium       ¨ Plain meats, fish, and poultry     ¨ Egg    · Other foods:      ¨ Homemade pudding    ¨ Unsalted nuts, popcorn, or pretzels    ¨ Unsalted butter or margarine  What are some ways that I can decrease sodium? · Add spices and herbs to foods instead of salt during cooking  Use salt-free seasonings to add flavor to foods  Examples include onion powder, garlic powder, basil, silverio powder, paprika, and parsley  Try lemon or lime juice or vinegar to give foods a tart flavor  Use hot peppers, pepper, or cayenne pepper to add a spicy flavor to foods  · Do not keep a salt shaker at your kitchen table  This may help keep you from adding salt to food at the table  It may take time to get used to enjoying the natural flavor of food instead of adding salt  Talk to your healthcare provider before you use salt substitutes  Some salt substitutes have a high amount of potassium and need to be avoided if you have kidney disease  · Choose low-sodium foods at restaurants  Meals from restaurants are often high in sodium  Some restaurants have nutrition information on the menu that tells you the amount of sodium in their foods  If possible, ask for your food to be prepared with less, or no salt  · Shop for unsalted or low-sodium foods and snacks at the grocery store  Examples include unsalted or low-sodium broths, soups, and canned vegetables  Choose fresh or frozen vegetables instead  Choose unsalted nuts or seeds or fresh fruits or vegetables as snacks  Read food labels and choose salt-free, very low-sodium, or low-sodium foods  CARE AGREEMENT:   You have the right to help plan your care  Discuss treatment options with your caregivers to decide what care you want to receive  You always have the right to refuse treatment  The above information is an  only  It is not intended as medical advice for individual conditions or treatments  Talk to your doctor, nurse or pharmacist before following any medical regimen to see if it is safe and effective for you    © 2017 [employer] Information is for End User's use only and may not be sold, redistributed or otherwise used for commercial purposes  All illustrations and images included in CareNotes® are the copyrighted property of A D A M , Inc  or Bennie Duran

## 2019-03-19 ENCOUNTER — APPOINTMENT (OUTPATIENT)
Dept: LAB | Facility: CLINIC | Age: 74
End: 2019-03-19
Payer: COMMERCIAL

## 2019-03-19 PROCEDURE — 81001 URINALYSIS AUTO W/SCOPE: CPT | Performed by: FAMILY MEDICINE

## 2019-03-20 LAB
BACTERIA UR QL AUTO: ABNORMAL /HPF
BILIRUB UR QL STRIP: NEGATIVE
CLARITY UR: CLEAR
COLOR UR: YELLOW
GLUCOSE UR STRIP-MCNC: NEGATIVE MG/DL
HGB UR QL STRIP.AUTO: NEGATIVE
KETONES UR STRIP-MCNC: NEGATIVE MG/DL
LEUKOCYTE ESTERASE UR QL STRIP: ABNORMAL
NITRITE UR QL STRIP: NEGATIVE
NON-SQ EPI CELLS URNS QL MICRO: ABNORMAL /HPF
PH UR STRIP.AUTO: 6 [PH]
PROT UR STRIP-MCNC: NEGATIVE MG/DL
RBC #/AREA URNS AUTO: ABNORMAL /HPF
SP GR UR STRIP.AUTO: 1.02 (ref 1–1.03)
UROBILINOGEN UR QL STRIP.AUTO: 0.2 E.U./DL
WBC #/AREA URNS AUTO: ABNORMAL /HPF

## 2019-03-21 ENCOUNTER — TELEPHONE (OUTPATIENT)
Dept: FAMILY MEDICINE CLINIC | Facility: CLINIC | Age: 74
End: 2019-03-21

## 2019-03-21 NOTE — TELEPHONE ENCOUNTER
Pt called for results put message on Dr Tita Aguillon desk to review urine, dr said results were unremarkable, pt was make aware

## 2019-03-22 DIAGNOSIS — K21.9 GASTROESOPHAGEAL REFLUX DISEASE WITHOUT ESOPHAGITIS: ICD-10-CM

## 2019-03-26 RX ORDER — ESOMEPRAZOLE MAGNESIUM 40 MG/1
CAPSULE, DELAYED RELEASE ORAL
Qty: 90 CAPSULE | Refills: 1 | OUTPATIENT
Start: 2019-03-26

## 2019-04-04 DIAGNOSIS — M19.90 ARTHRITIS: ICD-10-CM

## 2019-04-05 RX ORDER — TRAMADOL HYDROCHLORIDE 50 MG/1
100 TABLET ORAL 3 TIMES DAILY PRN
Qty: 90 TABLET | Refills: 0 | Status: SHIPPED | OUTPATIENT
Start: 2019-04-05 | End: 2019-05-06 | Stop reason: SDUPTHER

## 2019-05-06 DIAGNOSIS — M19.90 ARTHRITIS: ICD-10-CM

## 2019-05-08 RX ORDER — TRAMADOL HYDROCHLORIDE 50 MG/1
100 TABLET ORAL 3 TIMES DAILY PRN
Qty: 90 TABLET | Refills: 0 | Status: SHIPPED | OUTPATIENT
Start: 2019-05-08 | End: 2019-05-20 | Stop reason: SDUPTHER

## 2019-05-20 DIAGNOSIS — M19.90 ARTHRITIS: ICD-10-CM

## 2019-05-20 RX ORDER — TRAMADOL HYDROCHLORIDE 50 MG/1
100 TABLET ORAL 3 TIMES DAILY PRN
Qty: 90 TABLET | Refills: 0 | Status: SHIPPED | OUTPATIENT
Start: 2019-05-20 | End: 2019-07-01 | Stop reason: SDUPTHER

## 2019-05-31 ENCOUNTER — APPOINTMENT (OUTPATIENT)
Dept: LAB | Facility: CLINIC | Age: 74
End: 2019-05-31
Payer: COMMERCIAL

## 2019-05-31 DIAGNOSIS — E78.5 HYPERLIPIDEMIA, UNSPECIFIED HYPERLIPIDEMIA TYPE: ICD-10-CM

## 2019-05-31 DIAGNOSIS — Z11.59 NEED FOR HEPATITIS C SCREENING TEST: ICD-10-CM

## 2019-05-31 DIAGNOSIS — M05.9 RHEUMATOID ARTHRITIS WITH POSITIVE RHEUMATOID FACTOR, INVOLVING UNSPECIFIED SITE (HCC): ICD-10-CM

## 2019-05-31 DIAGNOSIS — R82.90 ABNORMAL URINE FINDING: ICD-10-CM

## 2019-05-31 DIAGNOSIS — R03.0 ELEVATED BLOOD PRESSURE READING WITHOUT DIAGNOSIS OF HYPERTENSION: ICD-10-CM

## 2019-05-31 LAB
25(OH)D3 SERPL-MCNC: 23.3 NG/ML (ref 30–100)
ANION GAP SERPL CALCULATED.3IONS-SCNC: 8 MMOL/L (ref 4–13)
BUN SERPL-MCNC: 16 MG/DL (ref 5–25)
CALCIUM SERPL-MCNC: 8.9 MG/DL (ref 8.3–10.1)
CHLORIDE SERPL-SCNC: 108 MMOL/L (ref 100–108)
CHOLEST SERPL-MCNC: 188 MG/DL (ref 50–200)
CO2 SERPL-SCNC: 24 MMOL/L (ref 21–32)
CREAT SERPL-MCNC: 0.69 MG/DL (ref 0.6–1.3)
GFR SERPL CREATININE-BSD FRML MDRD: 86 ML/MIN/1.73SQ M
GLUCOSE P FAST SERPL-MCNC: 99 MG/DL (ref 65–99)
HDLC SERPL-MCNC: 44 MG/DL (ref 40–60)
LDLC SERPL CALC-MCNC: 131 MG/DL (ref 0–100)
NONHDLC SERPL-MCNC: 144 MG/DL
POTASSIUM SERPL-SCNC: 3.2 MMOL/L (ref 3.5–5.3)
SODIUM SERPL-SCNC: 140 MMOL/L (ref 136–145)
TRIGL SERPL-MCNC: 67 MG/DL

## 2019-05-31 PROCEDURE — 87186 SC STD MICRODIL/AGAR DIL: CPT

## 2019-05-31 PROCEDURE — 87086 URINE CULTURE/COLONY COUNT: CPT

## 2019-05-31 PROCEDURE — 82306 VITAMIN D 25 HYDROXY: CPT

## 2019-05-31 PROCEDURE — 87077 CULTURE AEROBIC IDENTIFY: CPT

## 2019-05-31 PROCEDURE — 36415 COLL VENOUS BLD VENIPUNCTURE: CPT

## 2019-05-31 PROCEDURE — 86803 HEPATITIS C AB TEST: CPT

## 2019-05-31 PROCEDURE — 80048 BASIC METABOLIC PNL TOTAL CA: CPT

## 2019-05-31 PROCEDURE — 80061 LIPID PANEL: CPT

## 2019-06-01 LAB — HCV AB SER QL: NORMAL

## 2019-06-02 LAB — BACTERIA UR CULT: ABNORMAL

## 2019-06-03 DIAGNOSIS — B96.20 E. COLI UTI: Primary | ICD-10-CM

## 2019-06-03 DIAGNOSIS — N39.0 E. COLI UTI: Primary | ICD-10-CM

## 2019-06-03 RX ORDER — SULFAMETHOXAZOLE AND TRIMETHOPRIM 800; 160 MG/1; MG/1
1 TABLET ORAL 2 TIMES DAILY
Qty: 6 TABLET | Refills: 0 | Status: SHIPPED | OUTPATIENT
Start: 2019-06-03 | End: 2019-06-06

## 2019-07-01 DIAGNOSIS — M19.90 ARTHRITIS: ICD-10-CM

## 2019-07-03 RX ORDER — TRAMADOL HYDROCHLORIDE 50 MG/1
100 TABLET ORAL 3 TIMES DAILY PRN
Qty: 90 TABLET | Refills: 0 | Status: SHIPPED | OUTPATIENT
Start: 2019-07-03 | End: 2019-07-19 | Stop reason: SDUPTHER

## 2019-07-19 ENCOUNTER — OFFICE VISIT (OUTPATIENT)
Dept: FAMILY MEDICINE CLINIC | Facility: CLINIC | Age: 74
End: 2019-07-19
Payer: COMMERCIAL

## 2019-07-19 VITALS
SYSTOLIC BLOOD PRESSURE: 142 MMHG | TEMPERATURE: 98 F | HEART RATE: 92 BPM | RESPIRATION RATE: 17 BRPM | WEIGHT: 122 LBS | DIASTOLIC BLOOD PRESSURE: 102 MMHG | BODY MASS INDEX: 23.83 KG/M2 | OXYGEN SATURATION: 96 %

## 2019-07-19 DIAGNOSIS — I10 ESSENTIAL HYPERTENSION: Primary | ICD-10-CM

## 2019-07-19 DIAGNOSIS — Z78.0 POSTMENOPAUSAL: ICD-10-CM

## 2019-07-19 DIAGNOSIS — M19.90 ARTHRITIS: ICD-10-CM

## 2019-07-19 PROCEDURE — 99214 OFFICE O/P EST MOD 30 MIN: CPT | Performed by: FAMILY MEDICINE

## 2019-07-19 PROCEDURE — 1160F RVW MEDS BY RX/DR IN RCRD: CPT | Performed by: FAMILY MEDICINE

## 2019-07-19 RX ORDER — AMLODIPINE BESYLATE 2.5 MG/1
2.5 TABLET ORAL DAILY
Qty: 30 TABLET | Refills: 1 | Status: SHIPPED | OUTPATIENT
Start: 2019-07-19 | End: 2019-09-11 | Stop reason: SDUPTHER

## 2019-07-19 RX ORDER — FOLIC ACID 1 MG/1
1 TABLET ORAL DAILY
Qty: 90 TABLET | Refills: 1 | Status: SHIPPED | OUTPATIENT
Start: 2019-07-19 | End: 2020-08-05

## 2019-07-19 RX ORDER — TRAMADOL HYDROCHLORIDE 50 MG/1
100 TABLET ORAL 3 TIMES DAILY PRN
Qty: 90 TABLET | Refills: 0 | Status: SHIPPED | OUTPATIENT
Start: 2019-07-19 | End: 2019-08-19 | Stop reason: SDUPTHER

## 2019-07-19 NOTE — PROGRESS NOTES
Assessment/Plan:         Diagnoses and all orders for this visit:    Essential hypertension  Comments:  cpm  Orders:  -     amLODIPine (NORVASC) 2 5 mg tablet; Take 1 tablet (2 5 mg total) by mouth daily    Arthritis  Comments:  cpm  Orders:  -     traMADol (ULTRAM) 50 mg tablet; Take 2 tablets (100 mg total) by mouth 3 (three) times a day as needed for moderate pain or severe pain  -     folic acid (FOLVITE) 1 mg tablet; Take 1 tablet (1 mg total) by mouth daily    Postmenopausal  -     folic acid (FOLVITE) 1 mg tablet; Take 1 tablet (1 mg total) by mouth daily          Subjective:   Chief Complaint   Patient presents with    Follow-up        Patient ID: Doris Wagoner is a 76 y o  female  Routine f/u appt  States has been off of her diet "and eating crazy stuff, gained weight"  No other interval acute problems, no illnesses  Home bp's normal per pt      The following portions of the patient's history were reviewed and updated as appropriate: allergies, current medications, past family history, past medical history, past social history, past surgical history and problem list     Review of Systems   Constitutional: Negative  HENT: Negative for mouth sores, nosebleeds, sore throat and trouble swallowing  Eyes: Negative  Respiratory: Negative  Cardiovascular: Negative  Gastrointestinal: Negative  Endocrine: Negative  Genitourinary: Negative for decreased urine volume, difficulty urinating, dysuria, flank pain, frequency and hematuria  Musculoskeletal: Positive for arthralgias  Negative for gait problem  Skin: Negative  Neurological: Negative  Hematological: Negative  Psychiatric/Behavioral: Negative  Objective:      BP (!) 142/102   Pulse 92   Temp 98 °F (36 7 °C)   Resp 17   Wt 55 3 kg (122 lb)   SpO2 96%   BMI 23 83 kg/m²          Physical Exam   Constitutional: She is oriented to person, place, and time  She appears well-developed  She is cooperative  Non-toxic appearance  She does not have a sickly appearance  She does not appear ill  No distress  HENT:   Head: Normocephalic and atraumatic  Mouth/Throat: Uvula is midline, oropharynx is clear and moist and mucous membranes are normal    Eyes: Pupils are equal, round, and reactive to light  Conjunctivae and lids are normal    Neck: Trachea normal  Neck supple  No JVD present  No thyroid mass and no thyromegaly present  Cardiovascular: Normal rate, regular rhythm, normal heart sounds and normal pulses  Pulmonary/Chest: Effort normal and breath sounds normal    Abdominal: Soft  Bowel sounds are normal  She exhibits no distension and no mass  There is no hepatosplenomegaly  There is no tenderness  Lymphadenopathy:     She has no cervical adenopathy  Right: No supraclavicular adenopathy present  Left: No supraclavicular adenopathy present  Neurological: She is alert and oriented to person, place, and time  She has normal reflexes  Gait normal    Skin: Skin is warm and dry  Capillary refill takes less than 2 seconds  She is not diaphoretic  No cyanosis  No pallor  Psychiatric: She has a normal mood and affect  Her behavior is normal    Nursing note and vitals reviewed

## 2019-08-19 DIAGNOSIS — M19.90 ARTHRITIS: ICD-10-CM

## 2019-08-19 NOTE — TELEPHONE ENCOUNTER
Patient left message requesting a refill of Tramadol  Pharmacy is [facility] in [facility]

## 2019-08-22 RX ORDER — TRAMADOL HYDROCHLORIDE 50 MG/1
100 TABLET ORAL 3 TIMES DAILY PRN
Qty: 90 TABLET | Refills: 0 | Status: SHIPPED | OUTPATIENT
Start: 2019-08-22 | End: 2019-09-16 | Stop reason: SDUPTHER

## 2019-09-03 DIAGNOSIS — K21.9 GASTROESOPHAGEAL REFLUX DISEASE WITHOUT ESOPHAGITIS: ICD-10-CM

## 2019-09-03 NOTE — TELEPHONE ENCOUNTER
Message left requesting a refill of Nexium  Pharmacy is [facility] in [facility]

## 2019-09-05 RX ORDER — ESOMEPRAZOLE MAGNESIUM 40 MG/1
40 CAPSULE, DELAYED RELEASE ORAL DAILY
Qty: 90 CAPSULE | Refills: 1 | Status: SHIPPED | OUTPATIENT
Start: 2019-09-05 | End: 2020-03-04 | Stop reason: SDUPTHER

## 2019-09-11 DIAGNOSIS — I10 ESSENTIAL HYPERTENSION: ICD-10-CM

## 2019-09-11 RX ORDER — AMLODIPINE BESYLATE 2.5 MG/1
2.5 TABLET ORAL DAILY
Qty: 30 TABLET | Refills: 1 | Status: SHIPPED | OUTPATIENT
Start: 2019-09-11 | End: 2019-11-21 | Stop reason: SDUPTHER

## 2019-09-16 ENCOUNTER — OFFICE VISIT (OUTPATIENT)
Dept: FAMILY MEDICINE CLINIC | Facility: CLINIC | Age: 74
End: 2019-09-16
Payer: COMMERCIAL

## 2019-09-16 VITALS
DIASTOLIC BLOOD PRESSURE: 82 MMHG | RESPIRATION RATE: 17 BRPM | HEART RATE: 73 BPM | OXYGEN SATURATION: 97 % | SYSTOLIC BLOOD PRESSURE: 122 MMHG | TEMPERATURE: 97.7 F | BODY MASS INDEX: 22.84 KG/M2 | HEIGHT: 61 IN | WEIGHT: 121 LBS

## 2019-09-16 DIAGNOSIS — Z28.21 INFLUENZA VACCINATION DECLINED BY PATIENT: ICD-10-CM

## 2019-09-16 DIAGNOSIS — Z53.20 COLONOSCOPY REFUSED: ICD-10-CM

## 2019-09-16 DIAGNOSIS — Z53.20 MAMMOGRAM DECLINED: ICD-10-CM

## 2019-09-16 DIAGNOSIS — M19.90 ARTHRITIS: ICD-10-CM

## 2019-09-16 DIAGNOSIS — Z00.00 MEDICARE ANNUAL WELLNESS VISIT, SUBSEQUENT: Primary | ICD-10-CM

## 2019-09-16 DIAGNOSIS — Z28.21 PNEUMOCOCCAL VACCINATION DECLINED BY PATIENT: ICD-10-CM

## 2019-09-16 DIAGNOSIS — M06.9 RHEUMATOID ARTHRITIS INVOLVING BOTH WRISTS, UNSPECIFIED RHEUMATOID FACTOR PRESENCE: ICD-10-CM

## 2019-09-16 DIAGNOSIS — Z12.11 COLON CANCER SCREENING: ICD-10-CM

## 2019-09-16 PROCEDURE — 1170F FXNL STATUS ASSESSED: CPT | Performed by: FAMILY MEDICINE

## 2019-09-16 PROCEDURE — 3008F BODY MASS INDEX DOCD: CPT | Performed by: FAMILY MEDICINE

## 2019-09-16 PROCEDURE — 99214 OFFICE O/P EST MOD 30 MIN: CPT | Performed by: FAMILY MEDICINE

## 2019-09-16 PROCEDURE — G0439 PPPS, SUBSEQ VISIT: HCPCS | Performed by: FAMILY MEDICINE

## 2019-09-16 PROCEDURE — 1125F AMNT PAIN NOTED PAIN PRSNT: CPT | Performed by: FAMILY MEDICINE

## 2019-09-16 RX ORDER — TRAMADOL HYDROCHLORIDE 50 MG/1
100 TABLET ORAL 3 TIMES DAILY PRN
Qty: 90 TABLET | Refills: 0 | Status: SHIPPED | OUTPATIENT
Start: 2019-09-16 | End: 2019-10-15 | Stop reason: SDUPTHER

## 2019-09-16 NOTE — PATIENT INSTRUCTIONS
Medicare Preventive Visit Patient Instructions  Thank you for completing your Welcome to Medicare Visit or Medicare Annual Wellness Visit today  Your next wellness visit will be due in one year (9/16/2020)  The screening/preventive services that you may require over the next 5-10 years are detailed below  Some tests may not apply to you based off risk factors and/or age  Screening tests ordered at today's visit but not completed yet may show as past due  Also, please note that scanned in results may not display below  Preventive Screenings:  Service Recommendations Previous Testing/Comments   Colorectal Cancer Screening  * Colonoscopy    * Fecal Occult Blood Test (FOBT)/Fecal Immunochemical Test (FIT)  * Fecal DNA/Cologuard Test  * Flexible Sigmoidoscopy Age: 54-65 years old   Colonoscopy: every 10 years (may be performed more frequently if at higher risk)  OR  FOBT/FIT: every 1 year  OR  Cologuard: every 3 years  OR  Sigmoidoscopy: every 5 years  Screening may be recommended earlier than age 48 if at higher risk for colorectal cancer  Also, an individualized decision between you and your healthcare provider will decide whether screening between the ages of 74-80 would be appropriate  Colonoscopy: Not on file  FOBT/FIT: Not on file  Cologuard: Not on file  Sigmoidoscopy: Not on file         Breast Cancer Screening Age: 36 years old  Frequency: every 1-2 years  Not required if history of left and right mastectomy Mammogram: Not on file       Cervical Cancer Screening Between the ages of 21-29, pap smear recommended once every 3 years  Between the ages of 33-67, can perform pap smear with HPV co-testing every 5 years     Recommendations may differ for women with a history of total hysterectomy, cervical cancer, or abnormal pap smears in past  Pap Smear: Not on file    Screening Not Indicated   Hepatitis C Screening Once for adults born between [facility]  More frequently in patients at high risk for Hepatitis C Hep C Antibody: 05/31/2019    Screening Current   Diabetes Screening 1-2 times per year if you're at risk for diabetes or have pre-diabetes Fasting glucose: 99 mg/dL   A1C: No results in last 5 years    Screening Current   Cholesterol Screening Once every 5 years if you don't have a lipid disorder  May order more often based on risk factors  Lipid panel: 05/31/2019    Screening Not Indicated  History Lipid Disorder     Other Preventive Screenings Covered by Medicare:  1  Abdominal Aortic Aneurysm (AAA) Screening: covered once if your at risk  You're considered to be at risk if you have a family history of AAA  2  Lung Cancer Screening: covers low dose CT scan once per year if you meet all of the following conditions: (1) Age 50-69; (2) No signs or symptoms of lung cancer; (3) Current smoker or have quit smoking within the last 15 years; (4) You have a tobacco smoking history of at least 30 pack years (packs per day multiplied by number of years you smoked); (5) You get a written order from a healthcare provider  3  Glaucoma Screening: covered annually if you're considered high risk: (1) You have diabetes OR (2) Family history of glaucoma OR (3)  aged 48 and older OR (3)  American aged 72 and older  3  Osteoporosis Screening: covered every 2 years if you meet one of the following conditions: (1) You're estrogen deficient and at risk for osteoporosis based off medical history and other findings; (2) Have a vertebral abnormality; (3) On glucocorticoid therapy for more than 3 months; (4) Have primary hyperparathyroidism; (5) On osteoporosis medications and need to assess response to drug therapy  · Last bone density test (DXA Scan): Not on file  5  HIV Screening: covered annually if you're between the age of 12-76  Also covered annually if you are younger than 13 and older than 72 with risk factors for HIV infection   For pregnant patients, it is covered up to 3 times per pregnancy  Immunizations:  Immunization Recommendations   Influenza Vaccine Annual influenza vaccination during flu season is recommended for all persons aged >= 6 months who do not have contraindications   Pneumococcal Vaccine (Prevnar and Pneumovax)  * Prevnar = PCV13  * Pneumovax = PPSV23   Adults 25-60 years old: 1-3 doses may be recommended based on certain risk factors  Adults 72 years old: Prevnar (PCV13) vaccine recommended followed by Pneumovax (PPSV23) vaccine  If already received PPSV23 since turning 65, then PCV13 recommended at least one year after PPSV23 dose  Hepatitis B Vaccine 3 dose series if at intermediate or high risk (ex: diabetes, end stage renal disease, liver disease)   Tetanus (Td) Vaccine - COST NOT COVERED BY MEDICARE PART B Following completion of primary series, a booster dose should be given every 10 years to maintain immunity against tetanus  Td may also be given as tetanus wound prophylaxis  Tdap Vaccine - COST NOT COVERED BY MEDICARE PART B Recommended at least once for all adults  For pregnant patients, recommended with each pregnancy  Shingles Vaccine (Shingrix) - COST NOT COVERED BY MEDICARE PART B  2 shot series recommended in those aged 48 and above     Health Maintenance Due:      Topic Date Due    MAMMOGRAM  1945    CRC Screening: Colonoscopy  1945    Hepatitis C Screening  Completed     Immunizations Due:      Topic Date Due    DTaP,Tdap,and Td Vaccines (1 - Tdap) 05/05/1966    Pneumococcal Vaccine: 65+ Years (1 of 2 - PCV13) 05/05/2010    INFLUENZA VACCINE  07/01/2019     Advance Directives   What are advance directives? Advance directives are legal documents that state your wishes and plans for medical care  These plans are made ahead of time in case you lose your ability to make decisions for yourself  Advance directives can apply to any medical decision, such as the treatments you want, and if you want to donate organs     What are the types of advance directives? There are many types of advance directives, and each state has rules about how to use them  You may choose a combination of any of the following:  · Living will: This is a written record of the treatment you want  You can also choose which treatments you do not want, which to limit, and which to stop at a certain time  This includes surgery, medicine, IV fluid, and tube feedings  · Durable power of  for healthcare [facility]): This is a written record that states who you want to make healthcare choices for you when you are unable to make them for yourself  This person, called a proxy, is usually a family member or a friend  You may choose more than 1 proxy  · Do not resuscitate (DNR) order:  A DNR order is used in case your heart stops beating or you stop breathing  It is a request not to have certain forms of treatment, such as CPR  A DNR order may be included in other types of advance directives  · Medical directive: This covers the care that you want if you are in a coma, near death, or unable to make decisions for yourself  You can list the treatments you want for each condition  Treatment may include pain medicine, surgery, blood transfusions, dialysis, IV or tube feedings, and a ventilator (breathing machine)  · Values history: This document has questions about your views, beliefs, and how you feel and think about life  This information can help others choose the care that you would choose  Why are advance directives important? An advance directive helps you control your care  Although spoken wishes may be used, it is better to have your wishes written down  Spoken wishes can be misunderstood, or not followed  Treatments may be given even if you do not want them  An advance directive may make it easier for your family to make difficult choices about your care  Urinary Incontinence   Urinary incontinence (UI)  is when you lose control of your bladder   UI develops because your bladder cannot store or empty urine properly  The 3 most common types of UI are stress incontinence, urge incontinence, or both  Medicines:   · May be given to help strengthen your bladder control  Report any side effects of medication to your healthcare provider  Do pelvic muscle exercises often:  Your pelvic muscles help you stop urinating  Squeeze these muscles tight for 5 seconds, then relax for 5 seconds  Gradually work up to squeezing for 10 seconds  Do 3 sets of 15 repetitions a day, or as directed  This will help strengthen your pelvic muscles and improve bladder control  Train your bladder:  Go to the bathroom at set times, such as every 2 hours, even if you do not feel the urge to go  You can also try to hold your urine when you feel the urge to go  For example, hold your urine for 5 minutes when you feel the urge to go  As that becomes easier, hold your urine for 10 minutes  Self-care:   · Keep a UI record  Write down how often you leak urine and how much you leak  Make a note of what you were doing when you leaked urine  · Drink liquids as directed  You may need to limit the amount of liquid you drink to help control your urine leakage  Do not drink any liquid right before you go to bed  Limit or do not have drinks that contain caffeine or alcohol  · Prevent constipation  Eat a variety of high-fiber foods  Good examples are high-fiber cereals, beans, vegetables, and whole-grain breads  Walking is the best way to trigger your intestines to have a bowel movement  · Exercise regularly and maintain a healthy weight  Weight loss and exercise will decrease pressure on your bladder and help you control your leakage  · Use a catheter as directed  to help empty your bladder  A catheter is a tiny, plastic tube that is put into your bladder to drain your urine  · Go to behavior therapy as directed  Behavior therapy may be used to help you learn to control your urge to urinate         © Copyright [employer] 2018 Information is for [employer] use only and may not be sold, redistributed or otherwise used for commercial purposes   All illustrations and images included in CareNotes® are the copyrighted property of A D A M , Inc  or [facility] Malin

## 2019-09-16 NOTE — PROGRESS NOTES
Assessment and Plan:     Problem List Items Addressed This Visit     None           Preventive health issues were discussed with patient, and age appropriate screening tests were ordered as noted in patient's After Visit Summary  Personalized health advice and appropriate referrals for health education or preventive services given if needed, as noted in patient's After Visit Summary       History of Present Illness:     Patient presents for Medicare Annual Wellness visit    Patient Care Team:  Mihai Saldaña DO as PCP - General     Problem List:     Patient Active Problem List   Diagnosis    Arthritis    Deformity of left wrist joint    Female bladder prolapse    [place] (hard of hearing)    Left wrist pain    Osteoarthritis of knee    Postmenopausal vaginal bleeding    Rheumatoid arthritis ([address] )    Thyroid disorder    Colonoscopy refused    Gastroesophageal reflux disease without esophagitis    Medicare annual wellness visit, subsequent    Vaginal mucous membrane ulceration    Elevated blood pressure reading without diagnosis of hypertension      Past Medical and Surgical History:     Past Medical History:   Diagnosis Date    Lyme disease      Past Surgical History:   Procedure Laterality Date     SECTION      x2    REPLACEMENT TOTAL KNEE Left 2015    REPLACEMENT TOTAL KNEE Right     THYROID SURGERY      Age 21       Family History:     Family History   Problem Relation Age of Onset    Arthritis Mother     Hypertension Mother     Cancer Mother     Osteoporosis Mother     Alzheimer's disease Father     Arthritis Sister     Osteoporosis Sister     Rheum arthritis Sister     Arthritis Son     Leukemia Son     Cancer Other       Social History:     Social History     Socioeconomic History    Marital status:      Spouse name: None    Number of children: 2    Years of education: None    Highest education level: None   Occupational History    Occupation: Retired Comment: At Age 76    Occupation: House Cleaning   Social Needs    Financial resource strain: None    Food insecurity:     Worry: None     Inability: None    Transportation needs:     Medical: No     Non-medical: No   Tobacco Use    Smoking status: Never Smoker    Smokeless tobacco: Never Used    Tobacco comment: Passive Smoke exposure   Substance and Sexual Activity    Alcohol use: No    Drug use: No    Sexual activity: Yes     Partners: Male     Birth control/protection: Post-menopausal   Lifestyle    Physical activity:     Days per week: 7 days     Minutes per session: None    Stress: None   Relationships    Social connections:     Talks on phone: None     Gets together: None     Attends Jainism service: None     Active member of club or organization: None     Attends meetings of clubs or organizations: None     Relationship status: None    Intimate partner violence:     Fear of current or ex partner: None     Emotionally abused: None     Physically abused: None     Forced sexual activity: None   Other Topics Concern    None   Social History Narrative    Always uses seat belt       Medications and Allergies:     Current Outpatient Medications   Medication Sig Dispense Refill    amLODIPine (NORVASC) 2 5 mg tablet Take 1 tablet (2 5 mg total) by mouth daily 30 tablet 1    esomeprazole (NexIUM) 40 MG capsule Take 1 capsule (40 mg total) by mouth daily 90 capsule 1    traMADol (ULTRAM) 50 mg tablet Take 2 tablets (100 mg total) by mouth 3 (three) times a day as needed for moderate pain or severe pain 90 tablet 0    folic acid (FOLVITE) 1 mg tablet Take 1 tablet (1 mg total) by mouth daily (Patient not taking: Reported on 9/16/2019) 90 tablet 1    predniSONE 10 mg tablet Take 3 tabs BID X 2 days, 2 tabs BID X 2 days, 1 tab BID X 2 days, 1 tab daily X 2 days (Patient not taking: Reported on 9/14/2018 ) 26 tablet 0     No current facility-administered medications for this visit        Allergies Allergen Reactions    Codeine       Immunizations: There is no immunization history for the selected administration types on file for this patient  Health Maintenance:         Topic Date Due    MAMMOGRAM  1945    CRC Screening: Colonoscopy  1945    Hepatitis C Screening  Completed         Topic Date Due    DTaP,Tdap,and Td Vaccines (1 - Tdap) 05/05/1966    Pneumococcal Vaccine: 65+ Years (1 of 2 - PCV13) 05/05/2010    INFLUENZA VACCINE  07/01/2019      Medicare Health Risk Assessment:     Ht 5' 1" (1 549 m)   Wt 54 9 kg (121 lb)   BMI 22 86 kg/m²      Yohana Gan is here for her Subsequent Wellness visit  Health Risk Assessment:   Patient rates overall health as good  Patient feels that their physical health rating is slightly worse  Eyesight was rated as same  Hearing was rated as slightly worse  Patient feels that their emotional and mental health rating is much better  Pain experienced in the last 7 days has been some  Patient's pain rating has been 5/10  Patient states that she has experienced no weight loss or gain in last 6 months  Depression Screening:   PHQ-2 Score: 0      Fall Risk Screening: In the past year, patient has experienced: no history of falling in past year      Urinary Incontinence Screening:   Patient has not leaked urine accidently in the last six months  Home Safety:  Patient does not have trouble with stairs inside or outside of their home  Patient has working smoke alarms and has working carbon monoxide detector  Home safety hazards include: none  Nutrition:   Current diet is Low Carb and No Added Salt  Medications:   Patient is currently taking over-the-counter supplements  OTC medications include: see medication list  Patient is able to manage medications       Activities of Daily Living (ADLs)/Instrumental Activities of Daily Living (IADLs):   Walk and transfer into and out of bed and chair?: Yes  Dress and groom yourself?: Yes    Bathe or shower yourself?: Yes    Feed yourself?  Yes  Do your laundry/housekeeping?: Yes  Manage your money, pay your bills and track your expenses?: Yes  Make your own meals?: Yes    Do your own shopping?: Yes    Previous Hospitalizations:   Any hospitalizations or ED visits within the last 12 months?: No      Advance Care Planning:     Advanced directive counseling given: Yes    End of Life Decisions reviewed with patient: Yes      PREVENTIVE SCREENINGS      Cardiovascular Screening:    General: Screening Not Indicated and History Lipid Disorder      Diabetes Screening:     General: Screening Current      Colorectal Cancer Screening:     General: Risks and Benefits Discussed    Due for: FOBT/FIT      Breast Cancer Screening:     General: Patient Declines and Risks and Benefits Discussed      Cervical Cancer Screening:    General: Screening Not Indicated      Osteoporosis Screening:    General: Patient Declines and Risks and Benefits Discussed      Abdominal Aortic Aneurysm (AAA) Screening:        General: Screening Not Indicated      Lung Cancer Screening:     General: Screening Not Indicated      Hepatitis C Screening:    General: Screening Current      [address] [place] [address], DO

## 2019-09-16 NOTE — PROGRESS NOTES
Assessment/Plan:         Diagnoses and all orders for this visit:    Medicare annual wellness visit, subsequent    Arthritis  Comments:  cpm  Orders:  -     traMADol (ULTRAM) 50 mg tablet; Take 2 tablets (100 mg total) by mouth 3 (three) times a day as needed for moderate pain or severe pain    Mammogram declined    Influenza vaccination declined by patient    Pneumococcal vaccination declined by patient    Colonoscopy refused    Colon cancer screening  -     Occult Blood, Fecal Immunochemical; Future    Rheumatoid arthritis involving both wrists, unspecified rheumatoid factor presence ([facility] )  Comments:  was seeing rheumatologist, dr Chelsea Hudson, pt deferred RA meds            Subjective:   Chief Complaint   Patient presents with    Medicare Wellness Visit    Follow-up     Declines mammogram at this time  Patient ID: Maryann Thakkar is a 76 y o  female  Medicare wellness and f/u visit  bp was elevated last visit in July, normal today  Doing well, no interval acute illnesses or problems      [place] PDMP reviewed today prior to refill tramadol per report copy scanned to chart      The following portions of the patient's history were reviewed and updated as appropriate: allergies, current medications, past family history, past medical history, past social history, past surgical history and problem list     Review of Systems   Constitutional: Negative  HENT: Negative for mouth sores, nosebleeds, sore throat and trouble swallowing  Eyes: Negative  Respiratory: Negative  Cardiovascular: Negative  Gastrointestinal: Negative  Endocrine: Negative  Genitourinary: Negative for decreased urine volume, difficulty urinating, dysuria, flank pain, frequency and hematuria  Musculoskeletal: Positive for arthralgias  Skin: Negative  Neurological: Negative  Hematological: Negative            Objective:      /82 (BP Location: Left arm, Patient Position: Sitting, Cuff Size: Adult)   Pulse 73 Temp 97 7 °F (36 5 °C) (Tympanic)   Resp 17   Ht 5' 1" (1 549 m)   Wt 54 9 kg (121 lb)   SpO2 97%   BMI 22 86 kg/m²          Physical Exam   Constitutional: She is oriented to person, place, and time  She appears well-developed  She is cooperative  Non-toxic appearance  She does not have a sickly appearance  She does not appear ill  No distress  Appears younger than stated age   HENT:   Head: Normocephalic and atraumatic  Mouth/Throat: Uvula is midline, oropharynx is clear and moist and mucous membranes are normal    Eyes: Pupils are equal, round, and reactive to light  Conjunctivae and lids are normal    Neck: Trachea normal  Neck supple  No JVD present  No thyroid mass and no thyromegaly present  Cardiovascular: Normal rate, regular rhythm, normal heart sounds and normal pulses  Pulmonary/Chest: Effort normal and breath sounds normal    Abdominal: Soft  Bowel sounds are normal  She exhibits no distension and no mass  There is no hepatosplenomegaly  There is no tenderness  Lymphadenopathy:     She has no cervical adenopathy  Right: No supraclavicular adenopathy present  Left: No supraclavicular adenopathy present  Neurological: She is alert and oriented to person, place, and time  She has normal reflexes  Gait normal    Skin: Skin is warm and dry  Capillary refill takes less than 2 seconds  She is not diaphoretic  No cyanosis  No pallor  Psychiatric: She has a normal mood and affect  Her behavior is normal    Nursing note and vitals reviewed

## 2019-10-15 DIAGNOSIS — M19.90 ARTHRITIS: ICD-10-CM

## 2019-10-15 RX ORDER — TRAMADOL HYDROCHLORIDE 50 MG/1
100 TABLET ORAL 3 TIMES DAILY PRN
Qty: 90 TABLET | Refills: 0 | Status: SHIPPED | OUTPATIENT
Start: 2019-10-15 | End: 2019-11-21 | Stop reason: SDUPTHER

## 2019-10-15 NOTE — TELEPHONE ENCOUNTER
Patient left a message requesting a refill of Tramadol   Pharmacy is [facility]

## 2019-10-29 ENCOUNTER — TELEPHONE (OUTPATIENT)
Dept: FAMILY MEDICINE CLINIC | Facility: CLINIC | Age: 74
End: 2019-10-29

## 2019-10-31 ENCOUNTER — APPOINTMENT (OUTPATIENT)
Dept: LAB | Facility: CLINIC | Age: 74
End: 2019-10-31
Payer: COMMERCIAL

## 2019-10-31 DIAGNOSIS — Z12.11 COLON CANCER SCREENING: ICD-10-CM

## 2019-10-31 LAB — HEMOCCULT STL QL IA: NEGATIVE

## 2019-10-31 PROCEDURE — G0328 FECAL BLOOD SCRN IMMUNOASSAY: HCPCS

## 2019-11-21 DIAGNOSIS — I10 ESSENTIAL HYPERTENSION: ICD-10-CM

## 2019-11-21 DIAGNOSIS — K21.9 GASTROESOPHAGEAL REFLUX DISEASE WITHOUT ESOPHAGITIS: ICD-10-CM

## 2019-11-21 DIAGNOSIS — M19.90 ARTHRITIS: ICD-10-CM

## 2019-11-21 NOTE — TELEPHONE ENCOUNTER
Pt called and lm on  over night for a refill of tramadol, amlodipine and Esomeprazole  No pharm left   Pt number [phone]      Esomeprazole was sent for a 6 mo supply 9/5/19- not needed

## 2019-11-22 RX ORDER — TRAMADOL HYDROCHLORIDE 50 MG/1
100 TABLET ORAL 3 TIMES DAILY PRN
Qty: 90 TABLET | Refills: 0 | Status: SHIPPED | OUTPATIENT
Start: 2019-11-22 | End: 2020-01-06 | Stop reason: SDUPTHER

## 2019-11-22 RX ORDER — AMLODIPINE BESYLATE 2.5 MG/1
2.5 TABLET ORAL DAILY
Qty: 30 TABLET | Refills: 1 | Status: SHIPPED | OUTPATIENT
Start: 2019-11-22 | End: 2020-01-27 | Stop reason: SDUPTHER

## 2020-01-06 DIAGNOSIS — M19.90 ARTHRITIS: ICD-10-CM

## 2020-01-09 RX ORDER — TRAMADOL HYDROCHLORIDE 50 MG/1
100 TABLET ORAL 3 TIMES DAILY PRN
Qty: 90 TABLET | Refills: 0 | Status: SHIPPED | OUTPATIENT
Start: 2020-01-09 | End: 2020-02-05 | Stop reason: SDUPTHER

## 2020-01-27 DIAGNOSIS — I10 ESSENTIAL HYPERTENSION: ICD-10-CM

## 2020-01-29 RX ORDER — AMLODIPINE BESYLATE 2.5 MG/1
2.5 TABLET ORAL DAILY
Qty: 30 TABLET | Refills: 1 | Status: SHIPPED | OUTPATIENT
Start: 2020-01-29 | End: 2020-07-23 | Stop reason: SDUPTHER

## 2020-02-05 DIAGNOSIS — M19.90 ARTHRITIS: ICD-10-CM

## 2020-02-06 RX ORDER — TRAMADOL HYDROCHLORIDE 50 MG/1
100 TABLET ORAL 3 TIMES DAILY PRN
Qty: 90 TABLET | Refills: 0 | Status: SHIPPED | OUTPATIENT
Start: 2020-02-06 | End: 2020-02-27

## 2020-02-24 DIAGNOSIS — M19.90 ARTHRITIS: ICD-10-CM

## 2020-02-27 RX ORDER — TRAMADOL HYDROCHLORIDE 50 MG/1
TABLET ORAL
Qty: 90 TABLET | Refills: 0 | Status: SHIPPED | OUTPATIENT
Start: 2020-02-27 | End: 2020-03-19 | Stop reason: SDUPTHER

## 2020-03-04 DIAGNOSIS — K21.9 GASTROESOPHAGEAL REFLUX DISEASE WITHOUT ESOPHAGITIS: ICD-10-CM

## 2020-03-06 RX ORDER — ESOMEPRAZOLE MAGNESIUM 40 MG/1
40 CAPSULE, DELAYED RELEASE ORAL DAILY
Qty: 90 CAPSULE | Refills: 1 | Status: SHIPPED | OUTPATIENT
Start: 2020-03-06 | End: 2020-11-20

## 2020-03-16 DIAGNOSIS — M19.90 ARTHRITIS: ICD-10-CM

## 2020-03-19 DIAGNOSIS — M19.90 ARTHRITIS: ICD-10-CM

## 2020-03-19 RX ORDER — TRAMADOL HYDROCHLORIDE 50 MG/1
TABLET ORAL
Qty: 90 TABLET | Refills: 0 | Status: CANCELLED | OUTPATIENT
Start: 2020-03-19

## 2020-03-19 RX ORDER — TRAMADOL HYDROCHLORIDE 50 MG/1
TABLET ORAL
Qty: 90 TABLET | Refills: 0 | Status: SHIPPED | OUTPATIENT
Start: 2020-03-19 | End: 2020-04-03 | Stop reason: SDUPTHER

## 2020-04-03 DIAGNOSIS — M19.90 ARTHRITIS: ICD-10-CM

## 2020-04-04 RX ORDER — TRAMADOL HYDROCHLORIDE 50 MG/1
TABLET ORAL
Qty: 90 TABLET | Refills: 0 | Status: SHIPPED | OUTPATIENT
Start: 2020-04-04 | End: 2020-04-22 | Stop reason: SDUPTHER

## 2020-04-15 ENCOUNTER — TELEMEDICINE (OUTPATIENT)
Dept: FAMILY MEDICINE CLINIC | Facility: CLINIC | Age: 75
End: 2020-04-15
Payer: COMMERCIAL

## 2020-04-15 VITALS
WEIGHT: 113 LBS | BODY MASS INDEX: 21.34 KG/M2 | SYSTOLIC BLOOD PRESSURE: 120 MMHG | HEIGHT: 61 IN | DIASTOLIC BLOOD PRESSURE: 70 MMHG

## 2020-04-15 DIAGNOSIS — I10 ESSENTIAL HYPERTENSION: ICD-10-CM

## 2020-04-15 DIAGNOSIS — M06.9 RHEUMATOID ARTHRITIS INVOLVING MULTIPLE SITES, UNSPECIFIED RHEUMATOID FACTOR PRESENCE: ICD-10-CM

## 2020-04-15 DIAGNOSIS — E78.5 HYPERLIPIDEMIA, UNSPECIFIED HYPERLIPIDEMIA TYPE: Primary | ICD-10-CM

## 2020-04-15 DIAGNOSIS — E55.9 VITAMIN D DEFICIENCY: ICD-10-CM

## 2020-04-15 DIAGNOSIS — E07.9 THYROID DISORDER: ICD-10-CM

## 2020-04-15 PROCEDURE — 99442 PR PHYS/QHP TELEPHONE EVALUATION 11-20 MIN: CPT | Performed by: FAMILY MEDICINE

## 2020-04-22 DIAGNOSIS — M19.90 ARTHRITIS: ICD-10-CM

## 2020-04-26 RX ORDER — TRAMADOL HYDROCHLORIDE 50 MG/1
TABLET ORAL
Qty: 90 TABLET | Refills: 0 | Status: SHIPPED | OUTPATIENT
Start: 2020-04-26 | End: 2020-05-14 | Stop reason: SDUPTHER

## 2020-05-14 DIAGNOSIS — M19.90 ARTHRITIS: ICD-10-CM

## 2020-05-18 RX ORDER — TRAMADOL HYDROCHLORIDE 50 MG/1
TABLET ORAL
Qty: 90 TABLET | Refills: 0 | Status: SHIPPED | OUTPATIENT
Start: 2020-05-18 | End: 2020-06-30 | Stop reason: SDUPTHER

## 2020-06-26 ENCOUNTER — TELEPHONE (OUTPATIENT)
Dept: FAMILY MEDICINE CLINIC | Facility: CLINIC | Age: 75
End: 2020-06-26

## 2020-06-30 DIAGNOSIS — M19.90 ARTHRITIS: ICD-10-CM

## 2020-07-02 RX ORDER — TRAMADOL HYDROCHLORIDE 50 MG/1
TABLET ORAL
Qty: 90 TABLET | Refills: 0 | Status: SHIPPED | OUTPATIENT
Start: 2020-07-02 | End: 2020-07-23 | Stop reason: SDUPTHER

## 2020-07-22 ENCOUNTER — TELEPHONE (OUTPATIENT)
Dept: FAMILY MEDICINE CLINIC | Facility: CLINIC | Age: 75
End: 2020-07-22

## 2020-07-22 DIAGNOSIS — I10 ESSENTIAL HYPERTENSION: ICD-10-CM

## 2020-07-22 NOTE — TELEPHONE ENCOUNTER
Attempted to contact patient twice to obtain more information  Received message stating "The person you are trying to reach is not accepting calls at this time   Please try your call again later "

## 2020-07-22 NOTE — TELEPHONE ENCOUNTER
Pt left msg on voicemail that she can't get her BP down  It is 175/100- she has been taking 2 pills of her medication and it is still not coming down

## 2020-07-23 DIAGNOSIS — M19.90 ARTHRITIS: ICD-10-CM

## 2020-07-23 RX ORDER — TRAMADOL HYDROCHLORIDE 50 MG/1
TABLET ORAL
Qty: 90 TABLET | Refills: 0 | Status: SHIPPED | OUTPATIENT
Start: 2020-07-23 | End: 2020-08-10 | Stop reason: SDUPTHER

## 2020-07-23 RX ORDER — AMLODIPINE BESYLATE 2.5 MG/1
5 TABLET ORAL DAILY
Qty: 60 TABLET | Refills: 1 | Status: SHIPPED | OUTPATIENT
Start: 2020-07-23 | End: 2020-09-18

## 2020-07-23 NOTE — TELEPHONE ENCOUNTER
Called and informed patient of providers recommendations  Patient understood and had no questions  Will need a refill of medication due to doubling dose  Appointment scheduled for Aug 21 (next available in office appointment with PCP) but will call if any issues

## 2020-07-23 NOTE — TELEPHONE ENCOUNTER
Patient is requesting a refill of Tramadol   Pharmacy is [facility] in [place]    Last refill date: 7/2/20 # 90 with no refills - states using a little more than often, does not exceed amount written on directions  Last appointment: 4/15/20  Next appointment: 9/22/20

## 2020-07-23 NOTE — TELEPHONE ENCOUNTER
After further review of chart, phone number has been changed and needed to be updated  Called and spoke with patient  Patient states that it seems that at times her blood pressure runs high  Yesterday, blood pressure reading was 175/100  Patient denied feeling any chest pain, headaches, shortness of breath, dizziness, numbness/tingling of arms, changes in vision  Patient states that today her blood pressure was 135/88  Patient is taking the Amlodipine 2 5 mg daily  States that when her blood pressure is high she does sometimes take two  Yesterday she did take one Amlodipine  Please advise

## 2020-07-28 ENCOUNTER — TELEPHONE (OUTPATIENT)
Dept: FAMILY MEDICINE CLINIC | Facility: CLINIC | Age: 75
End: 2020-07-28

## 2020-07-28 NOTE — TELEPHONE ENCOUNTER
Pt called and left a detailed msg that that she was taking 2 BP pills like it was said to but she can't get it down  It was 183/106 and then took pills and it was 163/100  She also noticed she has a big bruise, swollen on the inside of her left leg  She stated it is about 8" long and some swelling  She wondered if this is a side effect from meds?

## 2020-07-29 NOTE — TELEPHONE ENCOUNTER
Called and informed patient of providers recommendation  Patient understood and will go to a carenow to be evaluated

## 2020-08-05 ENCOUNTER — APPOINTMENT (EMERGENCY)
Dept: RADIOLOGY | Facility: HOSPITAL | Age: 75
End: 2020-08-05
Payer: COMMERCIAL

## 2020-08-05 ENCOUNTER — HOSPITAL ENCOUNTER (EMERGENCY)
Facility: HOSPITAL | Age: 75
Discharge: HOME | End: 2020-08-05
Attending: EMERGENCY MEDICINE
Payer: COMMERCIAL

## 2020-08-05 ENCOUNTER — APPOINTMENT (EMERGENCY)
Dept: CT IMAGING | Facility: HOSPITAL | Age: 75
End: 2020-08-05
Payer: COMMERCIAL

## 2020-08-05 VITALS
SYSTOLIC BLOOD PRESSURE: 151 MMHG | OXYGEN SATURATION: 96 % | HEART RATE: 97 BPM | DIASTOLIC BLOOD PRESSURE: 74 MMHG | WEIGHT: 121.91 LBS | BODY MASS INDEX: 23.04 KG/M2 | RESPIRATION RATE: 20 BRPM | TEMPERATURE: 98.1 F
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
## 2020-08-05 DIAGNOSIS — S42.209A PROXIMAL HUMERAL FRACTURE: ICD-10-CM

## 2020-08-05 PROCEDURE — 99284 EMERGENCY DEPT VISIT MOD MDM: CPT

## 2020-08-05 PROCEDURE — 99285 EMERGENCY DEPT VISIT HI MDM: CPT | Performed by: EMERGENCY MEDICINE

## 2020-08-05 PROCEDURE — 70450 CT HEAD/BRAIN W/O DYE: CPT

## 2020-08-05 PROCEDURE — 73030 X-RAY EXAM OF SHOULDER: CPT

## 2020-08-05 PROCEDURE — G1004 CDSM NDSC: HCPCS

## 2020-08-05 NOTE — ED PROVIDER NOTES
History  Chief Complaint   Patient presents with   [address]  step off of step ladder  struck left shoulder on carpet     Patient is a 58-year-old female with history of hypertension who presents for a left shoulder injury  Patient says that she fell off of a step stool  She says that she thought she was at the bottom of the stool but was not  She denies hitting her head or losing consciousness  She is not on any blood thinners  Patient says that she landed on her left shoulder  She is having pain in the left upper arm area which she says is worse with any type of movement  He took 3 ibuprofen at home which seemed to help with the discomfort  She denies any numbness or tingling in her left arm  On exam, she has significant ecchymosis and swelling to the upper humerus area  Her left arm is neurovascularly intact  She has no C, T or L-spine tenderness  There is no obvious trauma to her head  Prior to Admission Medications   Prescriptions Last Dose Informant Patient Reported? Taking?    amLODIPine (NORVASC) 2 5 mg tablet Not Taking at Unknown time  No No   Sig: Take 2 tablets (5 mg total) by mouth daily   Patient not taking: Reported on 2020   esomeprazole (NexIUM) 40 MG capsule   No No   Sig: Take 1 capsule (40 mg total) by mouth daily   traMADol (ULTRAM) 50 mg tablet Not Taking at Unknown time  No No   Sig: take 2 tablets by mouth three times a day if needed for moderate pain to severe pain,   Patient not taking: Reported on 2020      Facility-Administered Medications: None       Past Medical History:   Diagnosis Date    Lyme disease        Past Surgical History:   Procedure Laterality Date     SECTION      x2    REPLACEMENT TOTAL KNEE Left 2015    REPLACEMENT TOTAL KNEE Right     THYROID SURGERY      Age 21        Family History   Problem Relation Age of Onset    Arthritis Mother     Hypertension Mother     Cancer Mother     Osteoporosis Mother    [facility] Alzheimer's disease Father     Arthritis Sister     Osteoporosis Sister     Rheum arthritis Sister     Arthritis Son     Leukemia Son     Cancer Other      I have reviewed and agree with the history as documented  E-Cigarette/Vaping    E-Cigarette Use Never User      E-Cigarette/Vaping Substances     Social History     Tobacco Use    Smoking status: Never Smoker    Smokeless tobacco: Never Used    Tobacco comment: Passive Smoke exposure   Substance Use Topics    Alcohol use: No    Drug use: No       Review of Systems   Constitutional: Negative for chills, diaphoresis and fever  HENT: Negative for congestion, sinus pressure, sore throat and trouble swallowing  Eyes: Negative for pain, discharge and itching  Respiratory: Negative for cough, chest tightness, shortness of breath and wheezing  Cardiovascular: Negative for chest pain, palpitations and leg swelling  Gastrointestinal: Negative for abdominal distention, abdominal pain, blood in stool, diarrhea, nausea and vomiting  Endocrine: Negative for polyphagia and polyuria  Genitourinary: Negative for difficulty urinating, dysuria, flank pain, hematuria, pelvic pain and vaginal bleeding  Musculoskeletal: Positive for arthralgias (left shoulder)  Negative for back pain  Skin: Negative for rash  Neurological: Negative for dizziness, syncope, weakness, light-headedness and headaches  Physical Exam  Physical Exam  Vitals signs and nursing note reviewed  Constitutional:       General: She is not in acute distress  Appearance: She is well-developed  HENT:      Head: Normocephalic and atraumatic  Right Ear: External ear normal       Left Ear: External ear normal       Mouth/Throat:      Pharynx: No oropharyngeal exudate  Eyes:      Conjunctiva/sclera: Conjunctivae normal       Pupils: Pupils are equal, round, and reactive to light  Neck:      Musculoskeletal: Normal range of motion and neck supple     Cardiovascular: Rate and Rhythm: Normal rate and regular rhythm  Heart sounds: Normal heart sounds  No murmur  No friction rub  No gallop  Pulmonary:      Effort: Pulmonary effort is normal  No respiratory distress  Breath sounds: Normal breath sounds  No wheezing or rales  Abdominal:      General: There is no distension  Palpations: Abdomen is soft  Tenderness: There is no abdominal tenderness  There is no guarding  Musculoskeletal: Normal range of motion  General: Swelling and tenderness present  No deformity  Comments: Left shoulder ecchymosis and swelling     Lymphadenopathy:      Cervical: No cervical adenopathy  Skin:     General: Skin is warm and dry  Neurological:      Mental Status: She is alert and oriented to person, place, and time  Cranial Nerves: No cranial nerve deficit  Sensory: No sensory deficit  Motor: No abnormal muscle tone  Vital Signs  ED Triage Vitals   Temperature Pulse Respirations Blood Pressure SpO2   08/05/20 0720 08/05/20 0720 08/05/20 0720 08/05/20 0720 08/05/20 0720   98 1 °F (36 7 °C) 98 20 151/74 98 %      Temp Source Heart Rate Source Patient Position - Orthostatic VS BP Location FiO2 (%)   08/05/20 0730 -- -- -- --   Tympanic          Pain Score       --                  Vitals:    08/05/20 0720 08/05/20 0730 08/05/20 0745   BP: 151/74     Pulse: 98 (!) 107 97         Visual Acuity  Visual Acuity      Most Recent Value   L Pupil Size (mm)  4   R Pupil Size (mm)  4          ED Medications  Medications - No data to display    Diagnostic Studies  Results Reviewed     None                 XR shoulder 2+ views LEFT   Final Result by Edwardo Che MD (08/05 2172)      Left proximal humeral fracture noted as above delineated  Workstation performed: [id number]         CT head without contrast   Final Result by Jackie Gutiérrez MD (19/29 0416)      No acute intracranial abnormality        Workstation performed: [id number] Procedures  Procedures         ED Course       US AUDIT      Most Recent Value   Initial Alcohol Screen: US AUDIT-C    1  How often do you have a drink containing alcohol?  0 Filed at: 08/05/2020 0721   2  How many drinks containing alcohol do you have on a typical day you are drinking? 0 Filed at: 08/05/2020 0721   3b  FEMALE Any Age, or MALE 65+: How often do you have 4 or more drinks on one occassion? 0 Filed at: 08/05/2020 4374   Audit-C Score  0 Filed at: 08/05/2020 3907                  HARJIT/DAST-10      Most Recent Value   How many times in the past year have you    Used an illegal drug or used a prescription medication for non-medical reasons? Never Filed at: 08/05/2020 2775                                MDM  Number of Diagnoses or Management Options  Diagnosis management comments: 55-year-old female presenting for left shoulder injury  Jamel Mak off of a step stool into left shoulder  Did not hit head or lose consciousness  Left arm is neurovascularly intact with he does have swelling and ecchymosis to left shoulder  No C, T or L-spine tenderness  No obvious trauma to head  Given age, will obtain CT head  Will obtain x-ray of left shoulder  Patient declined pain meds at this time  X-ray shows comminuted left proximal humeral fracture  Spoke with orthopedic doctor on-call who said that sling and outpatient follow-up is fine  Patient declined pain meds for home  Disposition  Final diagnoses:   Fall, initial encounter   Proximal humeral fracture     Time reflects when diagnosis was documented in both MDM as applicable and the Disposition within this note     Time User Action Codes Description Comment    8/5/2020  9:19 AM Lennie Yanez [[id number]] Fall, initial encounter     8/5/2020  9:19 AM Lennie Yanez [[id number]] Proximal humeral fracture       ED Disposition     ED Disposition Condition Date/Time Comment    Discharge Stable Wed Aug 5, 2020  9:19 AM Chilango Ramos discharge to home/self care  Follow-up Information     Follow up With Specialties Details Why Contact Info Additional [address] [facility] [place] [facility] Schedule an appointment as soon as possible for a visit  For follow up of Proximal humeral fracture Margo [address] [facility], [address] [place] [address], [phone], [address], [place] Orthopedic Surgery Schedule an appointment as soon as possible for a visit  For follow up of proximal humeral fracture [address] 1978  [place] [address]  [phone]             Patient's Medications   Discharge Prescriptions    No medications on file     No discharge procedures on file      PDMP Review       Value Time User    PDMP Reviewed  Yes 5/18/2020 11:40 AM Alisha Valladares DO          ED Provider  Electronically Signed by           Sascha Peralta DO  08/05/20 4564

## 2020-08-06 ENCOUNTER — TELEPHONE (OUTPATIENT)
Dept: OBGYN CLINIC | Facility: CLINIC | Age: 75
End: 2020-08-06

## 2020-08-10 ENCOUNTER — TELEPHONE (OUTPATIENT)
Dept: FAMILY MEDICINE CLINIC | Facility: CLINIC | Age: 75
End: 2020-08-10

## 2020-08-10 DIAGNOSIS — M19.90 ARTHRITIS: ICD-10-CM

## 2020-08-13 RX ORDER — TRAMADOL HYDROCHLORIDE 50 MG/1
TABLET ORAL
Qty: 90 TABLET | Refills: 0 | Status: SHIPPED | OUTPATIENT
Start: 2020-08-13 | End: 2020-08-21 | Stop reason: SDUPTHER

## 2020-08-14 ENCOUNTER — OFFICE VISIT (OUTPATIENT)
Dept: OBGYN CLINIC | Facility: CLINIC | Age: 75
End: 2020-08-14
Payer: COMMERCIAL

## 2020-08-14 VITALS
TEMPERATURE: 97.7 F | WEIGHT: 110 LBS | SYSTOLIC BLOOD PRESSURE: 142 MMHG | BODY MASS INDEX: 20.77 KG/M2 | DIASTOLIC BLOOD PRESSURE: 78 MMHG | HEIGHT: 61 IN

## 2020-08-14 DIAGNOSIS — S42.295A OTHER CLOSED NONDISPLACED FRACTURE OF PROXIMAL END OF LEFT HUMERUS, INITIAL ENCOUNTER: Primary | ICD-10-CM

## 2020-08-14 DIAGNOSIS — S42.202A DISPLACED FRACTURE OF PROXIMAL END OF LEFT HUMERUS: ICD-10-CM

## 2020-08-14 PROCEDURE — 3008F BODY MASS INDEX DOCD: CPT | Performed by: ORTHOPAEDIC SURGERY

## 2020-08-14 PROCEDURE — 99203 OFFICE O/P NEW LOW 30 MIN: CPT | Performed by: ORTHOPAEDIC SURGERY

## 2020-08-14 PROCEDURE — 3078F DIAST BP <80 MM HG: CPT | Performed by: ORTHOPAEDIC SURGERY

## 2020-08-14 PROCEDURE — 3077F SYST BP >= 140 MM HG: CPT | Performed by: ORTHOPAEDIC SURGERY

## 2020-08-14 PROCEDURE — 1160F RVW MEDS BY RX/DR IN RCRD: CPT | Performed by: ORTHOPAEDIC SURGERY

## 2020-08-14 PROCEDURE — 24500 CLTX HUMRL SHFT FX W/O MNPJ: CPT | Performed by: ORTHOPAEDIC SURGERY

## 2020-08-14 PROCEDURE — 1036F TOBACCO NON-USER: CPT | Performed by: ORTHOPAEDIC SURGERY

## 2020-08-14 NOTE — PROGRESS NOTES
ASSESSMENT/PLAN:    Diagnoses and all orders for this visit:    Other closed nondisplaced fracture of proximal end of left humerus, initial encounter  -     XR shoulder 2+ vw left; Future    Displaced fracture of proximal end of left humerus  -     Ambulatory referral to Orthopedic Surgery; Future        Repeat x-rays were performed today  The x-rays show that the humeral fracture is now more displaced  At this point we would recommend surgical intervention  A referral to Dr Kylah Jackson has been made  She is acceptable to this plan  She should continue to use the sling  No follow-ups on file       _____________________________________________________  CHIEF COMPLAINT:  Chief Complaint   Patient presents with    Left Shoulder - Fracture         SUBJECTIVE:  Eric Cummins is a 76 y o  female status post fall from approximately 9 days ago  Patient states that she was on a step stool and was trying climb off the stool and fell on the way down  She states that she landed on her left shoulder and had immediate pain in that area  She went to the emergency department where x-rays were performed  X-rays showed a mildly displaced left proximal humeral fracture  She denies any numbness or tingling  She denies any fever or chills  She is currently wearing a sling         The following portions of the patient's history were reviewed and updated as appropriate: allergies, current medications, past family history, past medical history, past social history, past surgical history and problem list     PAST MEDICAL HISTORY:  Past Medical History:   Diagnosis Date    Lyme disease        PAST SURGICAL HISTORY:  Past Surgical History:   Procedure Laterality Date     SECTION      x2    REPLACEMENT TOTAL KNEE Left 2015    REPLACEMENT TOTAL KNEE Right     THYROID SURGERY      Age 21        FAMILY HISTORY:  Family History   Problem Relation Age of Onset    Arthritis Mother     Hypertension Mother    [facility] Cancer Mother     Osteoporosis Mother     Alzheimer's disease Father     Arthritis Sister     Osteoporosis Sister     Rheum arthritis Sister     Arthritis Son     Leukemia Son     Cancer Other        SOCIAL HISTORY:  Social History     Tobacco Use    Smoking status: Never Smoker    Smokeless tobacco: Never Used    Tobacco comment: Passive Smoke exposure   Substance Use Topics    Alcohol use: No    Drug use: No       MEDICATIONS:    Current Outpatient Medications:     esomeprazole (NexIUM) 40 MG capsule, Take 1 capsule (40 mg total) by mouth daily, Disp: 90 capsule, Rfl: 1    traMADol (ULTRAM) 50 mg tablet, take 2 tablets by mouth three times a day if needed for moderate pain to severe pain,, Disp: 90 tablet, Rfl: 0    amLODIPine (NORVASC) 2 5 mg tablet, Take 2 tablets (5 mg total) by mouth daily (Patient not taking: Reported on 8/5/2020), Disp: 60 tablet, Rfl: 1    ALLERGIES:  Allergies   Allergen Reactions    Codeine        ROS:  Review of Systems     Constitutional: Negative for fatigue, fever or loss of apetite  HENT: Negative  Respiratory: Negative for shortness of breath, dyspnea  Cardiovascular: Negative for chest pain/tightness  Gastrointestinal: Negative for abdominal pain, N/V  Endocrine: Negative for cold/heat intolerance, unexplained weight loss/gain  Genitourinary: Negative for flank pain, dysuria, hematuria  Musculoskeletal: Positive for arthralgia   Skin: Negative for rash  Neurological: Negative for numbness or tingling  Psychiatric/Behavioral: Negative for agitation  _____________________________________________________  PHYSICAL EXAMINATION:    Blood pressure 142/78, temperature 97 7 °F (36 5 °C), height 5' 1" (1 549 m), weight 49 9 kg (110 lb)  Constitutional: Oriented to person, place, and time  Appears well-developed and well-nourished  No distress  HENT:   Head: Normocephalic  Eyes: Conjunctivae are normal  Right eye exhibits no discharge   Left eye exhibits no discharge  No scleral icterus  Cardiovascular: Normal rate  Pulmonary/Chest: Effort normal    Neurological: Alert and oriented to person, place, and time  Skin: Skin is warm and dry  No rash noted  Not diaphoretic  No erythema  No pallor  Psychiatric: Normal mood and affect  Behavior is normal  Judgment and thought content normal       MUSCULOSKELETAL EXAMINATION:   Physical Exam  Ortho Exam    Left upper extremity is neurovascularly intact  Fingers are pink and mobile  Compartments are soft  There is ecchymosis present  Range of motion of the shoulder is limited to pain  Pulses intact    Objective:  BP Readings from Last 1 Encounters:   08/14/20 142/78      Wt Readings from Last 1 Encounters:   08/14/20 49 9 kg (110 lb)        BMI:   Estimated body mass index is 20 78 kg/m² as calculated from the following:    Height as of this encounter: 5' 1" (1 549 m)  Weight as of this encounter: 49 9 kg (110 lb)  Radiographs:  _____________________________________________________  STUDIES REVIEWED:  I have personally reviewed pertinent films and reports in PACS  Repeat x-rays of her left shoulder show that the humeral fracture has now displaced further       PROCEDURES PERFORMED:  Fracture / Dislocation Treatment    Date/Time: 8/14/2020 11:06 AM  Performed by: Cheyanne Barron PA-C  Authorized by: Cheyanne Barron PA-C     Patient Location:  Clinic  Consent given by:  Patient  Injury location:  Upper arm  Location details:  Left upper arm  Injury type:  Fracture  Fracture type: humeral shaft    Neurovascular status: Neurovascularly intact    Distal perfusion: normal    Immobilization:  Sling          Cheyanne Barron PA-C

## 2020-08-21 ENCOUNTER — OFFICE VISIT (OUTPATIENT)
Dept: FAMILY MEDICINE CLINIC | Facility: CLINIC | Age: 75
End: 2020-08-21
Payer: COMMERCIAL

## 2020-08-21 VITALS
HEIGHT: 61 IN | SYSTOLIC BLOOD PRESSURE: 136 MMHG | BODY MASS INDEX: 20.58 KG/M2 | OXYGEN SATURATION: 99 % | DIASTOLIC BLOOD PRESSURE: 76 MMHG | WEIGHT: 109 LBS | TEMPERATURE: 97.5 F | RESPIRATION RATE: 18 BRPM | HEART RATE: 85 BPM

## 2020-08-21 DIAGNOSIS — R22.42 MASS OF LEFT LOWER EXTREMITY: ICD-10-CM

## 2020-08-21 DIAGNOSIS — M06.9 RHEUMATOID ARTHRITIS INVOLVING BOTH WRISTS, UNSPECIFIED RHEUMATOID FACTOR PRESENCE: ICD-10-CM

## 2020-08-21 DIAGNOSIS — I10 ESSENTIAL HYPERTENSION: Primary | ICD-10-CM

## 2020-08-21 DIAGNOSIS — M19.90 ARTHRITIS: ICD-10-CM

## 2020-08-21 DIAGNOSIS — S42.202K CLOSED FRACTURE OF PROXIMAL END OF LEFT HUMERUS WITH NONUNION, UNSPECIFIED FRACTURE MORPHOLOGY, SUBSEQUENT ENCOUNTER: ICD-10-CM

## 2020-08-21 PROBLEM — R03.0 ELEVATED BLOOD PRESSURE READING WITHOUT DIAGNOSIS OF HYPERTENSION: Status: RESOLVED | Noted: 2019-03-15 | Resolved: 2020-08-21

## 2020-08-21 PROCEDURE — 3074F SYST BP LT 130 MM HG: CPT | Performed by: FAMILY MEDICINE

## 2020-08-21 PROCEDURE — 1036F TOBACCO NON-USER: CPT | Performed by: FAMILY MEDICINE

## 2020-08-21 PROCEDURE — 99214 OFFICE O/P EST MOD 30 MIN: CPT | Performed by: FAMILY MEDICINE

## 2020-08-21 PROCEDURE — 3078F DIAST BP <80 MM HG: CPT | Performed by: FAMILY MEDICINE

## 2020-08-21 PROCEDURE — 1160F RVW MEDS BY RX/DR IN RCRD: CPT | Performed by: FAMILY MEDICINE

## 2020-08-21 RX ORDER — TRAMADOL HYDROCHLORIDE 50 MG/1
TABLET ORAL
Qty: 60 TABLET | Refills: 0 | Status: SHIPPED | OUTPATIENT
Start: 2020-08-21 | End: 2020-09-09

## 2020-08-21 RX ORDER — ASPIRIN 81 MG/1
81 TABLET ORAL DAILY
COMMUNITY
Start: 2020-08-03 | End: 2020-10-01

## 2020-08-21 NOTE — PROGRESS NOTES
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 [address] )  Comments:  pt has seen rheum in the past and was not agreeable to meds    Arthritis  Comments:  cpm  Orders:  -     traMADol (ULTRAM) 50 mg tablet; take 2 tablets by mouth three times a day if needed for moderate pain to severe pain,    Other orders  -     aspirin (ECOTRIN LOW STRENGTH) 81 mg EC tablet; Take 81 mg by mouth daily          Subjective:   Chief Complaint   Patient presents with    Follow-up     Has active blood work orders     Medication Refill     Requesting a refill of Tramadol         Patient ID: Te Yo is a 76 y o  female  Today's appt had been scheduled last month when pt had been getting high bp readings at home and had been advised to increase amlodipine dose to 5mg daily (from 2 5mg she had been on) and schedule office visit (per telephone notes below,) but today, pt states she had been using a wrist cuff at home and found out that it was very inaccurate, so she stopped taking the amlodipine altogether about 3 weeks ago  Sates she had been under more stress past few months- broke up with boyfriend, is in process of moving- and then fell earlier in the month -fell on 08/05 at home and suffered left prox humeral fx, seen at ER 08/05 and then by ortho in f/u 08/14, and per ortho note  "Subsequent x-rays performed today in the office shows the fracture has displaced    It is my medical opinion that she will need open reduction and internal fixation of her left proximal humerus performed by a shoulder specialist"  Pain "once in awhile when I twist to the left feels like a knife in my arm"  Pt also reports today that she had been starting to move boxes few weeks ago and banged her left shin, "got really black and blue and was red and big lump in the middle, the redness and bruising went away, but the lump hasn't gotten any better," hurts only if she presses on it, "been taking a baby aspirin every day because thought was maybe a blood clot"    Lilly Opitz     8:24 AM   Note    Pt left msg on voicemail that she can't get her BP down     It is 175/100- she has been taking 2 pills of her medication and it is still not coming down  8:24 AM   Lilly Opitz routed this conversation to [phone] Edie [place]    8:48 AM   Note    Attempted to contact patient twice to obtain more information  Received message stating "The person you are trying to reach is not accepting calls at this time  Please try your call again later "  July 23, 2020   Michael Parkinson     8:54 AM   Note    Attempted to contact patient twice to obtain more information  Received message stating "The person you are trying to reach is not accepting calls at this time  Please try your call again later "  Michael Parkinson   9:03 AM   Note    After further review of chart, phone number has been changed and needed to be updated    Called and spoke with patient  Patient states that it seems that at times her blood pressure runs high  Yesterday, blood pressure reading was 175/100  Patient denied feeling any chest pain, headaches, shortness of breath, dizziness, numbness/tingling of arms, changes in vision  Patient states that today her blood pressure was 135/88  Patient is taking the Amlodipine 2 5 mg daily  States that when her blood pressure is high she does sometimes take two  Yesterday she did take one Amlodipine    Please advise      9:03 AM   Michael Parkinson routed this conversation to Me   Me   to [facility]     9:21 AM   Note   Please have her continue 5mg amlodipine (2 tabs) every day and schedule pt for an appt  [employer]    9:33 AM   Note    Called and informed patient of providers recommendations  Patient understood and had no questions  Will need a refill of medication due to doubling dose    Appointment scheduled for Aug 21 (next available in office appointment with PCP) but will call if any issues        The following portions of the patient's history were reviewed and updated as appropriate: allergies, current medications, past family history, past medical history, past social history, past surgical history and problem list     Review of Systems      Objective:      /76 (BP Location: Right arm, Patient Position: Sitting)   Pulse 85   Temp 97 5 °F (36 4 °C)   Resp 18   Ht 5' 1" (1 549 m)   Wt 49 4 kg (109 lb)   SpO2 99%   BMI 20 60 kg/m²          Physical Exam  Musculoskeletal:      Comments: Mild to mod swelling left hand   Skin:

## 2020-08-26 ENCOUNTER — OFFICE VISIT (OUTPATIENT)
Dept: LAB | Facility: HOSPITAL | Age: 75
End: 2020-08-26
Payer: COMMERCIAL

## 2020-08-26 ENCOUNTER — HOSPITAL ENCOUNTER (OUTPATIENT)
Dept: ULTRASOUND IMAGING | Facility: HOSPITAL | Age: 75
Discharge: HOME | End: 2020-08-26
Payer: COMMERCIAL

## 2020-08-26 DIAGNOSIS — R22.42 MASS OF LEFT LOWER EXTREMITY: ICD-10-CM

## 2020-08-26 DIAGNOSIS — I10 ESSENTIAL HYPERTENSION: ICD-10-CM

## 2020-08-26 PROCEDURE — 76882 US LMTD JT/FCL EVL NVASC XTR: CPT

## 2020-08-26 PROCEDURE — 93005 ELECTROCARDIOGRAM TRACING: CPT

## 2020-08-27 ENCOUNTER — APPOINTMENT (OUTPATIENT)
Dept: RADIOLOGY | Facility: OTHER | Age: 75
End: 2020-08-27
Payer: COMMERCIAL

## 2020-08-27 ENCOUNTER — CONSULT (OUTPATIENT)
Dept: OBGYN CLINIC | Facility: OTHER | Age: 75
End: 2020-08-27
Payer: COMMERCIAL

## 2020-08-27 VITALS
HEART RATE: 77 BPM | DIASTOLIC BLOOD PRESSURE: 73 MMHG | HEIGHT: 61 IN | SYSTOLIC BLOOD PRESSURE: 128 MMHG | WEIGHT: 108 LBS | BODY MASS INDEX: 20.39 KG/M2

## 2020-08-27 DIAGNOSIS — S42.202A DISPLACED FRACTURE OF PROXIMAL END OF LEFT HUMERUS: ICD-10-CM

## 2020-08-27 DIAGNOSIS — M25.522 PAIN IN LEFT ELBOW: Primary | ICD-10-CM

## 2020-08-27 DIAGNOSIS — M19.032 ARTHRITIS OF LEFT WRIST: ICD-10-CM

## 2020-08-27 PROCEDURE — 99024 POSTOP FOLLOW-UP VISIT: CPT | Performed by: ORTHOPAEDIC SURGERY

## 2020-08-27 PROCEDURE — 73030 X-RAY EXAM OF SHOULDER: CPT

## 2020-08-27 PROCEDURE — 1160F RVW MEDS BY RX/DR IN RCRD: CPT | Performed by: ORTHOPAEDIC SURGERY

## 2020-08-27 PROCEDURE — 73070 X-RAY EXAM OF ELBOW: CPT

## 2020-08-27 PROCEDURE — 1036F TOBACCO NON-USER: CPT | Performed by: ORTHOPAEDIC SURGERY

## 2020-08-27 PROCEDURE — 3008F BODY MASS INDEX DOCD: CPT | Performed by: ORTHOPAEDIC SURGERY

## 2020-08-27 PROCEDURE — 3078F DIAST BP <80 MM HG: CPT | Performed by: ORTHOPAEDIC SURGERY

## 2020-08-27 PROCEDURE — 99213 OFFICE O/P EST LOW 20 MIN: CPT | Performed by: ORTHOPAEDIC SURGERY

## 2020-08-27 PROCEDURE — 3074F SYST BP LT 130 MM HG: CPT | Performed by: ORTHOPAEDIC SURGERY

## 2020-08-27 PROCEDURE — 73100 X-RAY EXAM OF WRIST: CPT

## 2020-08-27 NOTE — PROGRESS NOTES
Assessment   Diagnoses and all orders for this visit:    Displaced fracture of proximal end of left humerus    Left elbow pain    Left wrist severe arthritis         Discussion and Plan:    The patient's left proximal humerus fracture is 1 that is amenable to nonoperative care and is already healing based on her x-ray and her clinical examination that she is almost 4 weeks out from her injury  At this time will continue with nonoperative management of her left shoulder     Patient was instructed to remove the sling and start pendulum exercises on her own  Referral was place for physical therapy which I did explain she may do closer to her home  If she fails to improve with nonoperative management continues to be symptomatic with the left shoulder after healing I could offer her arthroplasty options of her left shoulder but at this time is not indicated and the patient is happy to avoid surgical intervention  Discussed with the patient that she does have severe degenerative changes in the left wrist which were pre-existing to the injury and our symptomatic at this time given her immobilization in the sling  If the wrist continue to be symptomatic after the proximal humerus has healed I can refer her to one of my colleagues to address the wrist   Her left elbow has mild degenerative change but no acute injury and I think will improve with range of motion in physical therapy    Subjective:   Patient ID: Mela Paget is a 76 y o  female      HPI  Patient presents today with a chief complaint of left shoulder pain  Patient had a fall off of a stool on 8/05/20 resulting in a proximal humerus fracture  Patient was initially seen by in the ED and placed christopher sling  Patient later follow up with Elaine Del Toro DO  New X-rays were taken which showed further displacement  Patient localized here pain at the fracture site    Pain is worse with movement and reduced by the sling and rest   She denies any numbness or tingling  Patient is also complaining of left elbow and wrist pain which she states occurred at the time of injury however she has not been evaluated for these issues  She complains of global swelling of the elbow and wrist   Increased pain with range of motion  Patient was seen and evaluted by Dr Pippa Mandel on 8/14/20 and was referred here today for orthopedic consultation  The following portions of the patient's history were reviewed and updated as appropriate: allergies, current medications, past family history, past medical history, past social history, past surgical history and problem list     Review of Systems   Constitutional: Negative for chills and fever  HENT: Negative for drooling and sneezing  Eyes: Negative for redness  Respiratory: Negative for cough and wheezing  Gastrointestinal: Negative for nausea and vomiting  Musculoskeletal:        Please see ortho exam   Psychiatric/Behavioral: Negative for behavioral problems  The patient is not nervous/anxious  Objective:  Ht 5' 1" (1 549 m)   Wt 49 kg (108 lb)   BMI 20 41 kg/m²       Left Elbow Exam     Tenderness   The patient is experiencing tenderness in the radial head  Other   Erythema: absent  Sensation: normal  Pulse: present    Comments:  Ecchymosis and swelling posterior aspect, global TTP        Left Shoulder Exam     Tenderness   Left shoulder tenderness location: fracture site  Other   Erythema: absent  Sensation: normal  Pulse: present     Comments:    Mild swelling  Range of motion and strength not tested secondary to fracture         Left wrist exam:   diffuse swelling globally, TTP globally, crepitation     Physical Exam  Vitals signs reviewed  Constitutional:       Appearance: She is well-developed  Eyes:      Pupils: Pupils are equal, round, and reactive to light  Pulmonary:      Effort: Pulmonary effort is normal       Breath sounds: Normal breath sounds     Skin:     General: Skin is warm and dry    Neurological:      Mental Status: She is alert and oriented to person, place, and time  Psychiatric:         Behavior: Behavior normal          Thought Content: Thought content normal          Judgment: Judgment normal            I have personally reviewed pertinent films in PACS and my interpretation is as follows  Left shoulder x-rays demonstrates a displaced proxima humerus fracture with stable alignment  Left elbow x-rays: no fracture or dislocation  Left wrist x-rays: no fracture or dislocation, severe carpal degenerative changes       Scribe Attestation    I,:   Kristen Sarabia am acting as a scribe while in the presence of the attending physician :        I,:   Scotty Capellan MD personally performed the services described in this documentation    as scribed in my presence :

## 2020-08-28 LAB
ATRIAL RATE: 72 BPM
P AXIS: 15 DEGREES
PR INTERVAL: 142 MS
QRS AXIS: -10 DEGREES
QRSD INTERVAL: 84 MS
QT INTERVAL: 384 MS
QTC INTERVAL: 420 MS
T WAVE AXIS: 28 DEGREES
VENTRICULAR RATE: 72 BPM

## 2020-08-28 PROCEDURE — 93010 ELECTROCARDIOGRAM REPORT: CPT | Performed by: INTERNAL MEDICINE

## 2020-08-31 ENCOUNTER — TELEPHONE (OUTPATIENT)
Dept: FAMILY MEDICINE CLINIC | Facility: CLINIC | Age: 75
End: 2020-08-31

## 2020-08-31 NOTE — TELEPHONE ENCOUNTER
Please let pt know that EKG was normal (EKG results are still being routed to "unknown provider") and the [address] shows that the lump is likely a seroma/hematoma, but will recheck at her next appt and if necessary check US again

## 2020-08-31 NOTE — TELEPHONE ENCOUNTER
Pt calling for results for u/s and ekg and she also said that she does not need to have surgery on her arm

## 2020-09-04 ENCOUNTER — EVALUATION (OUTPATIENT)
Dept: PHYSICAL THERAPY | Facility: CLINIC | Age: 75
End: 2020-09-04
Payer: COMMERCIAL

## 2020-09-04 DIAGNOSIS — S42.202A DISPLACED FRACTURE OF PROXIMAL END OF LEFT HUMERUS: Primary | ICD-10-CM

## 2020-09-04 DIAGNOSIS — M25.512 ACUTE PAIN OF LEFT SHOULDER: ICD-10-CM

## 2020-09-04 PROCEDURE — 97110 THERAPEUTIC EXERCISES: CPT | Performed by: PHYSICAL THERAPIST

## 2020-09-04 PROCEDURE — 97163 PT EVAL HIGH COMPLEX 45 MIN: CPT | Performed by: PHYSICAL THERAPIST

## 2020-09-04 NOTE — PROGRESS NOTES
PT Evaluation     Today's date: 2020  Patient name: Mela Paget  : 1945  MRN: [id number]  Referring provider: Rhianna Bradshaw MD  Dx:   Encounter Diagnosis     ICD-10-CM    1  Displaced fracture of proximal end of left humerus  S42     2  Acute pain of left shoulder  M25 [id number]        Start Time: 1010  Stop Time: 1055  Total time in clinic (min): 45 minutes    Assessment  Assessment details: Mela Paget was seen for an initial PT evaluation today  Patient is a 76 y o  female with diagnosis of left proximal humeral fracture and past medical history significant for HTN, OA, knee pain, RA, left wrist pain, thyroid disorder with surgery, hard of hearing, bilateral knee replacement  High complexity evaluation  due to number of participation restrictions, functional outcome measure of 55% limitation, and unstable clinical presentation  Findings today show limitation in left shoulder and elbow passive and active range of motion with significant weakness and instability along with pain impacting her ability to lift and carry  Skilled PT indicated to treat at this time to address left shoulder and range of motion, pain and progressing strength as tolerated  Impairments: abnormal or restricted ROM, impaired physical strength, lacks appropriate home exercise program, pain with function and poor posture     Goals  STG (6 weeks)  1  Patient's left shoulder flexion AROM will increase to 120 with 2/10 pain for increased ability to perform overhead ADLs  2  Patient will have 0/10 pain in left shoulder at rest   3  Patient's left shoulder strength will increase to 2+/5 for increased ability to lift  LTG (12 weeks)  1  Patient's UE AROM equal bilaterally for ability to complete hair hygiene, overhead, and behind the back ADLs  2  Patient's UE strength will be equal bilaterally for ability to lift and carry at PLOF     3  Patient will be independent with home exercise program for continued maintenance post PT discharge  Plan  Plan details: Patient opted to be seen 1x/week due to transportation issue  Progress note in 4 weeks  Patient would benefit from: skilled physical therapy  Planned modality interventions: cryotherapy, unattended electrical stimulation and thermotherapy: hydrocollator packs  Planned therapy interventions: manual therapy, neuromuscular re-education, therapeutic activities, therapeutic exercise, home exercise program and gait training  Frequency: 2-3x/week  Duration in weeks: 12  Plan of Care beginning date: 2020  Plan of Care expiration date: 2020  Treatment plan discussed with: PTA and patient        Subjective Evaluation    History of Present Illness  Date of onset: 2020  Mechanism of injury: Beatrice Ivey is a 76 y o  female who presents to outpatient Physical Therapy today with complaints of left shoulder pain  States she was helping her brother move and missed a step when she was coming down from a step stool causing her to fall and fx proximal left humerus  Saw ortho who stated she didn't need surgery would treat conservatively  Did wear sling for 3 weeks and was then DC  States she is now having difficulty sleeping, gripping, hair hygiene, reaching, lifting       Pain  Current pain rating: 3  At best pain ratin  At worst pain ratin  Location: proximal left UE, into elbow  Quality: dull ache and sharp  Relieving factors: rest    Social Support    Employment status: not working  Hand dominance: right      Diagnostic Tests  X-ray: abnormal  Patient Goals  Patient goal: 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, return to prior level of independance due to moving         Objective     Neurological Testing     Sensation     Shoulder   Left Shoulder   Intact: light touch    Right Shoulder   Intact: light touch    Active Range of Motion   Left Shoulder   Flexion: 45 degrees with pain  Abduction: 40 degrees with pain    Right Shoulder   Flexion: 154 degrees   Abduction: 145 degrees   External rotation BTH: C4   Internal rotation BTB: T6     Passive Range of Motion   Left Shoulder   Flexion: 83 degrees with pain  Abduction: 60 degrees with pain  External rotation 0°: 42 degrees with pain  Internal rotation 0°: Left shoulder passive internal rotation at 0 degrees: to belly       Left Elbow   Flexion: 155 degrees   Extension: -15 degrees     Strength/Myotome Testing     Left Shoulder     Planes of Motion   Flexion: 2-   Abduction: 2-   External rotation at 0°: 2-   Internal rotation at 0°: 2-     Isolated Muscles   Biceps: 2-   Triceps: 2-     Right Shoulder   Normal muscle strength    Left Wrist/Hand      (2nd hand position)     Trial 1: 5    Trial 2: 4    Trial 3: 4    Right Wrist/Hand      (2nd hand position)     Trial 1: 40    Trial 2: 39    Trial 3: 39    General Comments:      Shoulder Comments       FOTO: 45% function, 60% predicted function         Flowsheet Rows      Most Recent Value   PT/OT G-Codes   Current Score  45   Projected Score  60             Precautions: none noted  Progress note: 10/4  POC: 12/4    Manuals 9/4       PROM L shoulder and elbow                                Neuro Re-Ed        Shoulder rolls 10x       scap retraction 10x                                               Ther Ex        Supine PROM shoulder flexion :05x10       Supine elbow stretch ext 2 min       Table slides Flex, scap :05x10       pendulums 10x ea       Wrist AROM                                                                                                Ther Activity        pulleys                Gait Training                        Modalities        bouchra Clayton was given a handout and verbal instruction of customized home exercise program  Patient accepted program and was able to demonstrate exercises

## 2020-09-09 ENCOUNTER — TELEPHONE (OUTPATIENT)
Dept: FAMILY MEDICINE CLINIC | Facility: CLINIC | Age: 75
End: 2020-09-09

## 2020-09-09 DIAGNOSIS — M19.90 ARTHRITIS: ICD-10-CM

## 2020-09-10 ENCOUNTER — APPOINTMENT (OUTPATIENT)
Dept: PHYSICAL THERAPY | Facility: CLINIC | Age: 75
End: 2020-09-10
Payer: COMMERCIAL

## 2020-09-10 RX ORDER — TRAMADOL HYDROCHLORIDE 50 MG/1
TABLET ORAL
Qty: 60 TABLET | Refills: 0 | Status: SHIPPED | OUTPATIENT
Start: 2020-09-10 | End: 2020-09-25 | Stop reason: SDUPTHER

## 2020-09-15 NOTE — PROGRESS NOTES
PT Discharge    Today's date: 9/15/2020  Patient name: Roberto Carlos Rivas  : 1945  MRN: [id number]  Referring provider: Denis Rhodes MD  Dx:   Encounter Diagnosis     ICD-10-CM    1  Displaced fracture of proximal end of left humerus  S42  PT plan of care cert/re-cert   2  Acute pain of left shoulder  M25 [id number] PT plan of care cert/re-cert     Assessment   Assessment details: Roberto Carlos Rivas was seen in outpatient PT for the initial evaluation, no f/u session  Patient is a 76 y o  female with diagnosis of left proximal humeral fracture and past medical history significant for HTN, OA, knee pain, RA, left wrist pain, thyroid disorder with surgery, hard of hearing, bilateral knee replacement  Patient would like to self DC at this time due to moving  Please see below initial evaluation for most current objective findings  Goals - not reassessed due to self DC  STG (6 weeks)  1  Patient's left shoulder flexion AROM will increase to 120 with 2/10 pain for increased ability to perform overhead ADLs  2  Patient will have 0/10 pain in left shoulder at rest   3  Patient's left shoulder strength will increase to 2+/5 for increased ability to lift  LTG (12 weeks)  1  Patient's UE AROM equal bilaterally for ability to complete hair hygiene, overhead, and behind the back ADLs  2  Patient's UE strength will be equal bilaterally for ability to lift and carry at PLOF  3  Patient will be independent with home exercise program for continued maintenance post PT discharge  Plan   Plan details: Self DC PT  Plan of Care beginning date: 2020      Subjective Evaluation   History of Present Illness   Date of onset: 2020  Mechanism of injury: Roberto Carlos Rivas is a 76 y o  female who presents to outpatient Physical Therapy today with complaints of left shoulder pain   States she was helping her brother move and missed a step when she was coming down from a step stool causing her to fall and fx proximal left humerus  Saw ortho who stated she didn't need surgery would treat conservatively  Did wear sling for 3 weeks and was then DC  States she is now having difficulty sleeping, gripping, hair hygiene, reaching, lifting  Pain   Current pain rating: 3  At best pain ratin  At worst pain ratin  Location: proximal left UE, into elbow  Quality: dull ache and sharp  Relieving factors: rest  Social Support   Employment status: not working  Hand dominance: right    Diagnostic Tests   X-ray: abnormal  Patient Goals   Patient goal: 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, return to prior level of independance due to moving Sept       Objective   Neurological Testing   Sensation   Shoulder   Left Shoulder   Intact: light touch   Right Shoulder   Intact: light touch   Active Range of Motion   Left Shoulder   Flexion: 45 degrees with pain  Abduction: 40 degrees with pain    Right Shoulder   Flexion: 154 degrees   Abduction: 145 degrees   External rotation BTH: C4   Internal rotation BTB: T6   Passive Range of Motion   Left Shoulder   Flexion: 83 degrees with pain   Abduction: 60 degrees with pain   External rotation 0°: 42 degrees with pain   Internal rotation 0°: Left shoulder passive internal rotation at 0 degrees: to belly     Left Elbow   Flexion: 155 degrees   Extension: -15 degrees   Strength/Myotome Testing     Left Shoulder   Planes of Motion   Flexion: 2-   Abduction: 2-   External rotation at 0°: 2-   Internal rotation at 0°: 2-   Isolated Muscles   Biceps: 2-   Triceps: 2-     Right Shoulder   Normal muscle strength   Left Wrist/Hand    (2nd hand position)   Trial 1: 5  Trial 2: 4  Trial 3: 4   Right Wrist/Hand    (2nd hand position)   Trial 1: 40  Trial 2: 39  Trial 3: 39   General Comments:   Shoulder Comments   FOTO: 45% function, 60% predicted function

## 2020-09-17 ENCOUNTER — APPOINTMENT (OUTPATIENT)
Dept: PHYSICAL THERAPY | Facility: CLINIC | Age: 75
End: 2020-09-17
Payer: COMMERCIAL

## 2020-09-18 DIAGNOSIS — I10 ESSENTIAL HYPERTENSION: ICD-10-CM

## 2020-09-18 RX ORDER — AMLODIPINE BESYLATE 2.5 MG/1
TABLET ORAL
Qty: 60 TABLET | Refills: 1 | Status: SHIPPED | OUTPATIENT
Start: 2020-09-18 | End: 2021-02-01

## 2020-09-19 DIAGNOSIS — M19.90 ARTHRITIS: ICD-10-CM

## 2020-09-21 RX ORDER — TRAMADOL HYDROCHLORIDE 50 MG/1
TABLET ORAL
Qty: 60 TABLET | OUTPATIENT
Start: 2020-09-21

## 2020-09-22 DIAGNOSIS — M19.90 ARTHRITIS: ICD-10-CM

## 2020-09-23 RX ORDER — TRAMADOL HYDROCHLORIDE 50 MG/1
TABLET ORAL
Qty: 60 TABLET | OUTPATIENT
Start: 2020-09-23

## 2020-09-24 ENCOUNTER — APPOINTMENT (OUTPATIENT)
Dept: PHYSICAL THERAPY | Facility: CLINIC | Age: 75
End: 2020-09-24
Payer: COMMERCIAL

## 2020-09-25 ENCOUNTER — OFFICE VISIT (OUTPATIENT)
Dept: FAMILY MEDICINE CLINIC | Facility: CLINIC | Age: 75
End: 2020-09-25
Payer: COMMERCIAL

## 2020-09-25 VITALS
OXYGEN SATURATION: 98 % | HEART RATE: 84 BPM | BODY MASS INDEX: 19.83 KG/M2 | HEIGHT: 61 IN | TEMPERATURE: 96.8 F | DIASTOLIC BLOOD PRESSURE: 98 MMHG | WEIGHT: 105 LBS | SYSTOLIC BLOOD PRESSURE: 142 MMHG

## 2020-09-25 DIAGNOSIS — Z78.0 POSTMENOPAUSAL: ICD-10-CM

## 2020-09-25 DIAGNOSIS — Z00.00 MEDICARE ANNUAL WELLNESS VISIT, SUBSEQUENT: Primary | ICD-10-CM

## 2020-09-25 DIAGNOSIS — K08.89 TOOTHACHE: ICD-10-CM

## 2020-09-25 DIAGNOSIS — M06.9 RHEUMATOID ARTHRITIS INVOLVING BOTH WRISTS, UNSPECIFIED RHEUMATOID FACTOR PRESENCE: ICD-10-CM

## 2020-09-25 DIAGNOSIS — I10 ESSENTIAL HYPERTENSION: ICD-10-CM

## 2020-09-25 DIAGNOSIS — Z12.11 COLON CANCER SCREENING: ICD-10-CM

## 2020-09-25 DIAGNOSIS — R22.42 MASS OF LEFT LOWER EXTREMITY: ICD-10-CM

## 2020-09-25 DIAGNOSIS — M19.90 ARTHRITIS: ICD-10-CM

## 2020-09-25 PROCEDURE — 99214 OFFICE O/P EST MOD 30 MIN: CPT | Performed by: FAMILY MEDICINE

## 2020-09-25 PROCEDURE — G0439 PPPS, SUBSEQ VISIT: HCPCS | Performed by: FAMILY MEDICINE

## 2020-09-25 PROCEDURE — 1160F RVW MEDS BY RX/DR IN RCRD: CPT | Performed by: FAMILY MEDICINE

## 2020-09-25 PROCEDURE — 1125F AMNT PAIN NOTED PAIN PRSNT: CPT | Performed by: FAMILY MEDICINE

## 2020-09-25 PROCEDURE — 1036F TOBACCO NON-USER: CPT | Performed by: FAMILY MEDICINE

## 2020-09-25 PROCEDURE — 1170F FXNL STATUS ASSESSED: CPT | Performed by: FAMILY MEDICINE

## 2020-09-25 PROCEDURE — 3080F DIAST BP >= 90 MM HG: CPT | Performed by: FAMILY MEDICINE

## 2020-09-25 PROCEDURE — 3725F SCREEN DEPRESSION PERFORMED: CPT | Performed by: FAMILY MEDICINE

## 2020-09-25 RX ORDER — AMOXICILLIN 500 MG/1
500 CAPSULE ORAL EVERY 8 HOURS SCHEDULED
Qty: 21 CAPSULE | Refills: 0 | Status: SHIPPED | OUTPATIENT
Start: 2020-09-25 | End: 2020-10-02

## 2020-09-25 RX ORDER — TRAMADOL HYDROCHLORIDE 50 MG/1
TABLET ORAL
Qty: 60 TABLET | Refills: 0 | Status: SHIPPED | OUTPATIENT
Start: 2020-09-25 | End: 2020-10-14

## 2020-09-25 RX ORDER — MELOXICAM 15 MG/1
15 TABLET ORAL DAILY
Qty: 30 TABLET | Refills: 1 | Status: SHIPPED | OUTPATIENT
Start: 2020-09-25 | End: 2020-11-30

## 2020-09-25 NOTE — PATIENT INSTRUCTIONS
Medicare Preventive Visit Patient Instructions  Thank you for completing your Welcome to Medicare Visit or Medicare Annual Wellness Visit today  Your next wellness visit will be due in one year (9/25/2021)  The screening/preventive services that you may require over the next 5-10 years are detailed below  Some tests may not apply to you based off risk factors and/or age  Screening tests ordered at today's visit but not completed yet may show as past due  Also, please note that scanned in results may not display below  Preventive Screenings:  Service Recommendations Previous Testing/Comments   Colorectal Cancer Screening  * Colonoscopy    * Fecal Occult Blood Test (FOBT)/Fecal Immunochemical Test (FIT)  * Fecal DNA/Cologuard Test  * Flexible Sigmoidoscopy Age: 54-65 years old   Colonoscopy: every 10 years (may be performed more frequently if at higher risk)  OR  FOBT/FIT: every 1 year  OR  Cologuard: every 3 years  OR  Sigmoidoscopy: every 5 years  Screening may be recommended earlier than age 48 if at higher risk for colorectal cancer  Also, an individualized decision between you and your healthcare provider will decide whether screening between the ages of 74-80 would be appropriate  Colonoscopy: Not on file  FOBT/FIT: 10/31/2019  Cologuard: Not on file  Sigmoidoscopy: Not on file         Breast Cancer Screening Age: 36 years old  Frequency: every 1-2 years  Not required if history of left and right mastectomy Mammogram: Not on file       Cervical Cancer Screening Between the ages of 21-29, pap smear recommended once every 3 years  Between the ages of 33-67, can perform pap smear with HPV co-testing every 5 years     Recommendations may differ for women with a history of total hysterectomy, cervical cancer, or abnormal pap smears in past  Pap Smear: Not on file    Screening Not Indicated   Hepatitis C Screening Once for adults born between [facility]  More frequently in patients at high risk for Hepatitis C Hep C Antibody: 05/31/2019    Screening Current   Diabetes Screening 1-2 times per year if you're at risk for diabetes or have pre-diabetes Fasting glucose: 99 mg/dL   A1C: No results in last 5 years       Cholesterol Screening Once every 5 years if you don't have a lipid disorder  May order more often based on risk factors  Lipid panel: 05/31/2019    Screening Not Indicated  History Lipid Disorder     Other Preventive Screenings Covered by Medicare:  1  Abdominal Aortic Aneurysm (AAA) Screening: covered once if your at risk  You're considered to be at risk if you have a family history of AAA  2  Lung Cancer Screening: covers low dose CT scan once per year if you meet all of the following conditions: (1) Age 50-69; (2) No signs or symptoms of lung cancer; (3) Current smoker or have quit smoking within the last 15 years; (4) You have a tobacco smoking history of at least 30 pack years (packs per day multiplied by number of years you smoked); (5) You get a written order from a healthcare provider  3  Glaucoma Screening: covered annually if you're considered high risk: (1) You have diabetes OR (2) Family history of glaucoma OR (3)  aged 48 and older OR (3)  American aged 72 and older  3  Osteoporosis Screening: covered every 2 years if you meet one of the following conditions: (1) You're estrogen deficient and at risk for osteoporosis based off medical history and other findings; (2) Have a vertebral abnormality; (3) On glucocorticoid therapy for more than 3 months; (4) Have primary hyperparathyroidism; (5) On osteoporosis medications and need to assess response to drug therapy  · Last bone density test (DXA Scan): Not on file  5  HIV Screening: covered annually if you're between the age of 12-76  Also covered annually if you are younger than 13 and older than 72 with risk factors for HIV infection   For pregnant patients, it is covered up to 3 times per pregnancy  Immunizations:  Immunization Recommendations   Influenza Vaccine Annual influenza vaccination during flu season is recommended for all persons aged >= 6 months who do not have contraindications   Pneumococcal Vaccine (Prevnar and Pneumovax)  * Prevnar = PCV13  * Pneumovax = PPSV23   Adults 25-60 years old: 1-3 doses may be recommended based on certain risk factors  Adults 72 years old: Prevnar (PCV13) vaccine recommended followed by Pneumovax (PPSV23) vaccine  If already received PPSV23 since turning 65, then PCV13 recommended at least one year after PPSV23 dose  Hepatitis B Vaccine 3 dose series if at intermediate or high risk (ex: diabetes, end stage renal disease, liver disease)   Tetanus (Td) Vaccine - COST NOT COVERED BY MEDICARE PART B Following completion of primary series, a booster dose should be given every 10 years to maintain immunity against tetanus  Td may also be given as tetanus wound prophylaxis  Tdap Vaccine - COST NOT COVERED BY MEDICARE PART B Recommended at least once for all adults  For pregnant patients, recommended with each pregnancy  Shingles Vaccine (Shingrix) - COST NOT COVERED BY MEDICARE PART B  2 shot series recommended in those aged 48 and above     Health Maintenance Due:      Topic Date Due    Hepatitis C Screening  Completed     Immunizations Due:      Topic Date Due    DTaP,Tdap,and Td Vaccines (1 - Tdap) 05/05/1966    Pneumococcal Vaccine: 65+ Years (1 of 1 - PPSV23) 05/05/2010    Influenza Vaccine  07/01/2020     Advance Directives   What are advance directives? Advance directives are legal documents that state your wishes and plans for medical care  These plans are made ahead of time in case you lose your ability to make decisions for yourself  Advance directives can apply to any medical decision, such as the treatments you want, and if you want to donate organs  What are the types of advance directives?   There are many types of advance directives, and each state has rules about how to use them  You may choose a combination of any of the following:  · Living will: This is a written record of the treatment you want  You can also choose which treatments you do not want, which to limit, and which to stop at a certain time  This includes surgery, medicine, IV fluid, and tube feedings  · Durable power of  for healthcare [facility]): This is a written record that states who you want to make healthcare choices for you when you are unable to make them for yourself  This person, called a proxy, is usually a family member or a friend  You may choose more than 1 proxy  · Do not resuscitate (DNR) order:  A DNR order is used in case your heart stops beating or you stop breathing  It is a request not to have certain forms of treatment, such as CPR  A DNR order may be included in other types of advance directives  · Medical directive: This covers the care that you want if you are in a coma, near death, or unable to make decisions for yourself  You can list the treatments you want for each condition  Treatment may include pain medicine, surgery, blood transfusions, dialysis, IV or tube feedings, and a ventilator (breathing machine)  · Values history: This document has questions about your views, beliefs, and how you feel and think about life  This information can help others choose the care that you would choose  Why are advance directives important? An advance directive helps you control your care  Although spoken wishes may be used, it is better to have your wishes written down  Spoken wishes can be misunderstood, or not followed  Treatments may be given even if you do not want them  An advance directive may make it easier for your family to make difficult choices about your care  Fall Prevention    Fall prevention  includes ways to make your home and other areas safer  It also includes ways you can move more carefully to prevent a fall   Health conditions that cause changes in your blood pressure, vision, or muscle strength and coordination may increase your risk for falls  Medicines may also increase your risk for falls if they make you dizzy, weak, or sleepy  Fall prevention tips:   · Stand or sit up slowly  · Use assistive devices as directed  · Wear shoes that fit well and have soles that   · Wear a personal alarm  · Stay active  · Manage your medical conditions  Home Safety Tips:  · Add items to prevent falls in the bathroom  · Keep paths clear  · Install bright lights in your home  · Keep items you use often on shelves within reach  · Paint or place reflective tape on the edges of your stairs  Urinary Incontinence   Urinary incontinence (UI)  is when you lose control of your bladder  UI develops because your bladder cannot store or empty urine properly  The 3 most common types of UI are stress incontinence, urge incontinence, or both  Medicines:   · May be given to help strengthen your bladder control  Report any side effects of medication to your healthcare provider  Do pelvic muscle exercises often:  Your pelvic muscles help you stop urinating  Squeeze these muscles tight for 5 seconds, then relax for 5 seconds  Gradually work up to squeezing for 10 seconds  Do 3 sets of 15 repetitions a day, or as directed  This will help strengthen your pelvic muscles and improve bladder control  Train your bladder:  Go to the bathroom at set times, such as every 2 hours, even if you do not feel the urge to go  You can also try to hold your urine when you feel the urge to go  For example, hold your urine for 5 minutes when you feel the urge to go  As that becomes easier, hold your urine for 10 minutes  Self-care:   · Keep a UI record  Write down how often you leak urine and how much you leak  Make a note of what you were doing when you leaked urine  · Drink liquids as directed   You may need to limit the amount of liquid you drink to help control your urine leakage  Do not drink any liquid right before you go to bed  Limit or do not have drinks that contain caffeine or alcohol  · Prevent constipation  Eat a variety of high-fiber foods  Good examples are high-fiber cereals, beans, vegetables, and whole-grain breads  Walking is the best way to trigger your intestines to have a bowel movement  · Exercise regularly and maintain a healthy weight  Weight loss and exercise will decrease pressure on your bladder and help you control your leakage  · Use a catheter as directed  to help empty your bladder  A catheter is a tiny, plastic tube that is put into your bladder to drain your urine  · Go to behavior therapy as directed  Behavior therapy may be used to help you learn to control your urge to urinate  © Copyright [employer] 2018 Information is for End User's use only and may not be sold, redistributed or otherwise used for commercial purposes   All illustrations and images included in CareNotes® are the copyrighted property of A D A M , Inc  or [address] [place] [address] [employer]

## 2020-09-25 NOTE — PROGRESS NOTES
Assessment/Plan:         Diagnoses and all orders for this visit:    Medicare annual wellness visit, subsequent    Essential hypertension    Colon cancer screening  -     Occult Blood, Fecal Immunochemical; Future    Postmenopausal  -     DXA bone density spine hip and pelvis; Future    Rheumatoid arthritis involving both wrists, unspecified rheumatoid factor presence  -     meloxicam (MOBIC) 15 mg tablet; Take 1 tablet (15 mg total) by mouth daily    Mass of left lower extremity  Comments:  area of hematoma that occurred awhile ago, repeat US to reassess  Orders:  -     US extremity soft tissue; Future    Toothache  -     amoxicillin (AMOXIL) 500 mg capsule; Take 1 capsule (500 mg total) by mouth every 8 (eight) hours for 7 days          Subjective:   Chief Complaint   Patient presents with   [facility] Wellness Visit     left shoulder pain  Discuss medication for pain  Declined influenza vaccine  Patient ID: Luz Weber is a 76 y o  female  Here for medicare wellness and f/u appt  Didn't go back on amlodipine  Active lab orders from April telemed visit- pt states she never got them in the mail   C/o new problem "Toothache on the right"  Tramadol works for her chronic wrist problem and had been taking after left arm fracture- 2 tabs 2-3x a day; 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, but difficult and was really hurting after that        The following portions of the patient's history were reviewed and updated as appropriate: allergies, current medications, past family history, past medical history, past social history, past surgical history and problem list     Review of Systems   All other systems reviewed and are negative          Objective:      /98 (BP Location: Left arm, Patient Position: Sitting, Cuff Size: Standard)   Pulse 84   Temp (!) 96 8 °F (36 °C) (Temporal)   Ht 5' 1" (1 549 m)   Wt 47 6 kg (105 lb)   SpO2 98%   BMI 19 84 kg/m²          Physical Exam  Vitals signs and nursing note reviewed  Constitutional:       General: She is not in acute distress  Appearance: She is well-developed  She is not ill-appearing, toxic-appearing or diaphoretic  HENT:      Head: Normocephalic and atraumatic  Eyes:      General: Lids are normal       Conjunctiva/sclera: Conjunctivae normal       Pupils: Pupils are equal, round, and reactive to light  Neck:      Musculoskeletal: Neck supple  Thyroid: No thyroid mass or thyromegaly  Vascular: No JVD  Trachea: Trachea normal    Cardiovascular:      Rate and Rhythm: Normal rate and regular rhythm  Pulses: Normal pulses  Heart sounds: Normal heart sounds  Pulmonary:      Effort: Pulmonary effort is normal       Breath sounds: Normal breath sounds  Abdominal:      General: Bowel sounds are normal  There is no distension  Palpations: Abdomen is soft  There is no hepatomegaly, splenomegaly or mass  Tenderness: There is no abdominal tenderness  Musculoskeletal:      Right lower leg: No edema  Left lower leg: No edema  Lymphadenopathy:      Cervical: No cervical adenopathy  Upper Body:      Right upper body: No supraclavicular adenopathy  Left upper body: No supraclavicular adenopathy  Skin:     General: Skin is warm and dry  Capillary Refill: Capillary refill takes less than 2 seconds  Coloration: Skin is not pale  Neurological:      Mental Status: She is alert and oriented to person, place, and time  Gait: Gait normal       Deep Tendon Reflexes: Reflexes are normal and symmetric  Psychiatric:         Mood and Affect: Mood normal          Behavior: Behavior normal  Behavior is cooperative

## 2020-09-25 NOTE — PROGRESS NOTES
Assessment and Plan:     Problem List Items Addressed This Visit        Cardiovascular and Mediastinum    Essential hypertension       Other    Medicare annual wellness visit, subsequent - Primary      Other Visit Diagnoses     Colon cancer screening               Preventive health issues were discussed with patient, and age appropriate screening tests were ordered as noted in patient's After Visit Summary  Personalized health advice and appropriate referrals for health education or preventive services given if needed, as noted in patient's After Visit Summary       History of Present Illness:     Patient presents for Medicare Annual Wellness visit    Patient Care Team:  Josue Quintana DO as PCP - Carmelita Morrell DO as PCP - [address] [place] [address] (RTE)     Problem List:     Patient Active Problem List   Diagnosis    Arthritis    Deformity of left wrist joint    Female bladder prolapse    Elem (hard of hearing)    Left wrist pain    Osteoarthritis of knee    Postmenopausal vaginal bleeding    Rheumatoid arthritis involving multiple sites ([address] )    Thyroid disorder    Colonoscopy refused    Gastroesophageal reflux disease without esophagitis    Medicare annual wellness visit, subsequent    Vaginal mucous membrane ulceration    Essential hypertension    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    Mass of left lower extremity    Rheumatoid arthritis of both wrists ([address] )      Past Medical and Surgical History:     Past Medical History:   Diagnosis Date    Lyme disease      Past Surgical History:   Procedure Laterality Date     SECTION      x2    REPLACEMENT TOTAL KNEE Left 2015    REPLACEMENT TOTAL KNEE Right     THYROID SURGERY      Age 21       Family History:     Family History   Problem Relation Age of Onset    Arthritis Mother     Hypertension Mother     Cancer Mother     Osteoporosis Mother     Alzheimer's disease Father     Arthritis Sister     Osteoporosis Sister     Rheum arthritis Sister     Arthritis Son     Leukemia Son     Cancer Other       Social History:        Social History     Socioeconomic History    Marital status:      Spouse name: None    Number of children: 2    Years of education: None    Highest education level: None   Occupational History    Occupation: Retired     Comment: At Age 76    Occupation: House Cleaning   Social Needs    Financial resource strain: None    Food insecurity     Worry: None     Inability: None    Transportation needs     Medical: No     Non-medical: No   Tobacco Use    Smoking status: Never Smoker    Smokeless tobacco: Never Used    Tobacco comment: Passive Smoke exposure   Substance and Sexual Activity    Alcohol use: No    Drug use: No    Sexual activity: Yes     Partners: Male     Birth control/protection: Post-menopausal   Lifestyle    Physical activity     Days per week: 7 days     Minutes per session: None    Stress: None   Relationships    Social connections     Talks on phone: None     Gets together: None     Attends Buddhist service: None     Active member of club or organization: None     Attends meetings of clubs or organizations: None     Relationship status: None    Intimate partner violence     Fear of current or ex partner: None     Emotionally abused: None     Physically abused: None     Forced sexual activity: None   Other Topics Concern    None   Social History Narrative    Always uses seat belt      Medications and Allergies:     Current Outpatient Medications   Medication Sig Dispense Refill    esomeprazole (NexIUM) 40 MG capsule Take 1 capsule (40 mg total) by mouth daily 90 capsule 1    traMADol (ULTRAM) 50 mg tablet take 2 tablets three times a day if needed for MODERATE TO SEVERE PAIN 60 tablet 0    amLODIPine (NORVASC) 2 5 mg tablet take 2 tablets by mouth once daily (Patient not taking: Reported on 9/25/2020) 60 tablet 1    aspirin (ECOTRIN LOW STRENGTH) 81 mg EC tablet Take 81 mg by mouth daily       No current facility-administered medications for this visit  Allergies   Allergen Reactions    Codeine       Immunizations: There is no immunization history for the selected administration types on file for this patient  Health Maintenance:         Topic Date Due    Hepatitis C Screening  Completed         Topic Date Due    DTaP,Tdap,and Td Vaccines (1 - Tdap) 05/05/1966    Pneumococcal Vaccine: 65+ Years (1 of 1 - PPSV23) 05/05/2010    Influenza Vaccine  07/01/2020      Medicare Health Risk Assessment:     /98 (BP Location: Left arm, Patient Position: Sitting, Cuff Size: Standard)   Pulse 84   Temp (!) 96 8 °F (36 °C) (Temporal)   Ht 5' 1" (1 549 m)   Wt 47 6 kg (105 lb)   SpO2 98%   BMI 19 84 kg/m²      Kwesi Smith is here for her Subsequent Wellness visit  Health Risk Assessment:   Patient rates overall health as very good  Patient feels that their physical health rating is much better  Eyesight was rated as same  Hearing was rated as slightly worse  Patient feels that their emotional and mental health rating is much better  Pain experienced in the last 7 days has been a lot  Patient's pain rating has been 9/10  Patient states that she has experienced no weight loss or gain in last 6 months  Depression Screening:   PHQ-2 Score: 0      Fall Risk Screening: In the past year, patient has experienced: history of falling in past year    Number of falls: 1  Injured during fall?: Yes    Feels unsteady when standing or walking?: No    Worried about falling?: No      Urinary Incontinence Screening:   Patient has not leaked urine accidently in the last six months  Home Safety:  Patient has trouble with stairs inside or outside of their home  Patient has working smoke alarms and has working carbon monoxide detector  Home safety hazards include: none  Nutrition:   Current diet is Regular   Fasting diet    Medications: Patient is currently taking over-the-counter supplements  OTC medications include: see medication list  Patient is able to manage medications  Activities of Daily Living (ADLs)/Instrumental Activities of Daily Living (IADLs):   Walk and transfer into and out of bed and chair?: Yes  Dress and groom yourself?: Yes    Bathe or shower yourself?: Yes    Feed yourself?  Yes  Do your laundry/housekeeping?: Yes  Manage your money, pay your bills and track your expenses?: Yes  Make your own meals?: Yes    Do your own shopping?: Yes    Previous Hospitalizations:   Any hospitalizations or ED visits within the last 12 months?: Yes    How many hospitalizations have you had in the last year?: 1-2    Hospitalization Comments: Fall down steps and broke left arm    Advance Care Planning:   Living will: No    Advanced directive: No    Advanced directive counseling given: Yes    Five wishes given: Yes      Cognitive Screening:   Provider or family/friend/caregiver concerned regarding cognition?: No    PREVENTIVE SCREENINGS      Cardiovascular Screening:    General: Screening Not Indicated and History Lipid Disorder      Diabetes Screening:     General: Risks and Benefits Discussed    Due for: Blood Glucose      Colorectal Cancer Screening:     General: Risks and Benefits Discussed    Due for: FOBT/FIT      Breast Cancer Screening:     General: Patient Declines      Cervical Cancer Screening:    General: Screening Not Indicated      Osteoporosis Screening:    General: Risks and Benefits Discussed    Due for: DXA Axial      Abdominal Aortic Aneurysm (AAA) Screening:        General: Screening Not Indicated      Lung Cancer Screening:     General: Screening Not Indicated      Hepatitis C Screening:    General: Screening Current      [address] [place] [address], DO

## 2020-09-28 ENCOUNTER — APPOINTMENT (OUTPATIENT)
Dept: LAB | Facility: CLINIC | Age: 75
End: 2020-09-28
Payer: COMMERCIAL

## 2020-09-28 DIAGNOSIS — Z12.11 COLON CANCER SCREENING: ICD-10-CM

## 2020-09-28 DIAGNOSIS — M06.9 RHEUMATOID ARTHRITIS INVOLVING MULTIPLE SITES, UNSPECIFIED RHEUMATOID FACTOR PRESENCE: ICD-10-CM

## 2020-09-28 DIAGNOSIS — E55.9 VITAMIN D DEFICIENCY: ICD-10-CM

## 2020-09-28 DIAGNOSIS — E07.9 THYROID DISORDER: ICD-10-CM

## 2020-09-28 DIAGNOSIS — E78.5 HYPERLIPIDEMIA, UNSPECIFIED HYPERLIPIDEMIA TYPE: ICD-10-CM

## 2020-09-28 LAB
25(OH)D3 SERPL-MCNC: 31.6 NG/ML (ref 30–100)
ALBUMIN SERPL BCP-MCNC: 3.8 G/DL (ref 3.5–5)
ALP SERPL-CCNC: 108 U/L (ref 46–116)
ALT SERPL W P-5'-P-CCNC: 31 U/L (ref 12–78)
ANION GAP SERPL CALCULATED.3IONS-SCNC: 6 MMOL/L (ref 4–13)
AST SERPL W P-5'-P-CCNC: 18 U/L (ref 5–45)
BASOPHILS # BLD AUTO: 0.05 THOUSANDS/ΜL (ref 0–0.1)
BASOPHILS NFR BLD AUTO: 1 % (ref 0–1)
BILIRUB SERPL-MCNC: 0.47 MG/DL (ref 0.2–1)
BUN SERPL-MCNC: 26 MG/DL (ref 5–25)
CALCIUM SERPL-MCNC: 8.7 MG/DL (ref 8.3–10.1)
CHLORIDE SERPL-SCNC: 108 MMOL/L (ref 100–108)
CHOLEST SERPL-MCNC: 242 MG/DL (ref 50–200)
CO2 SERPL-SCNC: 26 MMOL/L (ref 21–32)
CREAT SERPL-MCNC: 0.59 MG/DL (ref 0.6–1.3)
EOSINOPHIL # BLD AUTO: 0.17 THOUSAND/ΜL (ref 0–0.61)
EOSINOPHIL NFR BLD AUTO: 2 % (ref 0–6)
ERYTHROCYTE [DISTWIDTH] IN BLOOD BY AUTOMATED COUNT: 12.8 % (ref 11.6–15.1)
GFR SERPL CREATININE-BSD FRML MDRD: 90 ML/MIN/1.73SQ M
GLUCOSE P FAST SERPL-MCNC: 83 MG/DL (ref 65–99)
HCT VFR BLD AUTO: 42.2 % (ref 34.8–46.1)
HDLC SERPL-MCNC: 51 MG/DL
HEMOCCULT STL QL IA: NEGATIVE
HGB BLD-MCNC: 13.9 G/DL (ref 11.5–15.4)
IMM GRANULOCYTES # BLD AUTO: 0.03 THOUSAND/UL (ref 0–0.2)
IMM GRANULOCYTES NFR BLD AUTO: 0 % (ref 0–2)
LDLC SERPL CALC-MCNC: 179 MG/DL (ref 0–100)
LYMPHOCYTES # BLD AUTO: 1.93 THOUSANDS/ΜL (ref 0.6–4.47)
LYMPHOCYTES NFR BLD AUTO: 26 % (ref 14–44)
MCH RBC QN AUTO: 29.2 PG (ref 26.8–34.3)
MCHC RBC AUTO-ENTMCNC: 32.9 G/DL (ref 31.4–37.4)
MCV RBC AUTO: 89 FL (ref 82–98)
MONOCYTES # BLD AUTO: 0.69 THOUSAND/ΜL (ref 0.17–1.22)
MONOCYTES NFR BLD AUTO: 9 % (ref 4–12)
NEUTROPHILS # BLD AUTO: 4.58 THOUSANDS/ΜL (ref 1.85–7.62)
NEUTS SEG NFR BLD AUTO: 62 % (ref 43–75)
NONHDLC SERPL-MCNC: 191 MG/DL
NRBC BLD AUTO-RTO: 0 /100 WBCS
PLATELET # BLD AUTO: 356 THOUSANDS/UL (ref 149–390)
PMV BLD AUTO: 9.7 FL (ref 8.9–12.7)
POTASSIUM SERPL-SCNC: 3.5 MMOL/L (ref 3.5–5.3)
PROT SERPL-MCNC: 7.5 G/DL (ref 6.4–8.2)
RBC # BLD AUTO: 4.76 MILLION/UL (ref 3.81–5.12)
SODIUM SERPL-SCNC: 140 MMOL/L (ref 136–145)
TRIGL SERPL-MCNC: 61 MG/DL
TSH SERPL DL<=0.05 MIU/L-ACNC: 0.99 UIU/ML (ref 0.36–3.74)
WBC # BLD AUTO: 7.45 THOUSAND/UL (ref 4.31–10.16)

## 2020-09-28 PROCEDURE — 85025 COMPLETE CBC W/AUTO DIFF WBC: CPT

## 2020-09-28 PROCEDURE — 80053 COMPREHEN METABOLIC PANEL: CPT

## 2020-09-28 PROCEDURE — G0328 FECAL BLOOD SCRN IMMUNOASSAY: HCPCS

## 2020-09-28 PROCEDURE — 84443 ASSAY THYROID STIM HORMONE: CPT

## 2020-09-28 PROCEDURE — 36415 COLL VENOUS BLD VENIPUNCTURE: CPT

## 2020-09-28 PROCEDURE — 82306 VITAMIN D 25 HYDROXY: CPT

## 2020-09-28 PROCEDURE — 80061 LIPID PANEL: CPT

## 2020-09-29 ENCOUNTER — HOSPITAL ENCOUNTER (OUTPATIENT)
Dept: BONE DENSITY | Facility: HOSPITAL | Age: 75
Discharge: HOME | End: 2020-09-29
Payer: COMMERCIAL

## 2020-09-29 DIAGNOSIS — Z78.0 POSTMENOPAUSAL: ICD-10-CM

## 2020-09-29 PROCEDURE — 77080 DXA BONE DENSITY AXIAL: CPT

## 2020-10-08 DIAGNOSIS — M81.0 AGE RELATED OSTEOPOROSIS, UNSPECIFIED PATHOLOGICAL FRACTURE PRESENCE: Primary | ICD-10-CM

## 2020-10-08 RX ORDER — ALENDRONATE SODIUM 70 MG/1
70 TABLET ORAL
Qty: 12 TABLET | Refills: 3 | Status: SHIPPED | OUTPATIENT
Start: 2020-10-08 | End: 2020-10-31

## 2020-10-13 DIAGNOSIS — M19.90 ARTHRITIS: ICD-10-CM

## 2020-10-14 RX ORDER — TRAMADOL HYDROCHLORIDE 50 MG/1
TABLET ORAL
Qty: 90 TABLET | Refills: 0 | Status: SHIPPED | OUTPATIENT
Start: 2020-10-14 | End: 2020-10-29

## 2020-10-14 RX ORDER — AMOXICILLIN 500 MG/1
500 CAPSULE ORAL EVERY 8 HOURS SCHEDULED
OUTPATIENT
Start: 2020-10-14

## 2020-10-26 DIAGNOSIS — M19.90 ARTHRITIS: ICD-10-CM

## 2020-10-29 RX ORDER — TRAMADOL HYDROCHLORIDE 50 MG/1
TABLET ORAL
Qty: 90 TABLET | Refills: 0 | Status: SHIPPED | OUTPATIENT
Start: 2020-10-29 | End: 2020-11-18

## 2020-11-16 DIAGNOSIS — M19.90 ARTHRITIS: ICD-10-CM

## 2020-11-18 RX ORDER — TRAMADOL HYDROCHLORIDE 50 MG/1
TABLET ORAL
Qty: 90 TABLET | Refills: 0 | Status: SHIPPED | OUTPATIENT
Start: 2020-11-18 | End: 2020-12-03

## 2020-11-19 DIAGNOSIS — K21.9 GASTROESOPHAGEAL REFLUX DISEASE WITHOUT ESOPHAGITIS: ICD-10-CM

## 2020-11-20 RX ORDER — ESOMEPRAZOLE MAGNESIUM 40 MG/1
CAPSULE, DELAYED RELEASE ORAL
Qty: 90 CAPSULE | Refills: 1 | Status: SHIPPED | OUTPATIENT
Start: 2020-11-20 | End: 2021-05-21

## 2020-12-01 DIAGNOSIS — M19.90 ARTHRITIS: ICD-10-CM

## 2020-12-03 RX ORDER — TRAMADOL HYDROCHLORIDE 50 MG/1
TABLET ORAL
Qty: 90 TABLET | Refills: 0 | Status: SHIPPED | OUTPATIENT
Start: 2020-12-03 | End: 2020-12-23

## 2020-12-19 DIAGNOSIS — M19.90 ARTHRITIS: ICD-10-CM

## 2020-12-21 ENCOUNTER — OFFICE VISIT (OUTPATIENT)
Dept: OBGYN CLINIC | Facility: MEDICAL CENTER | Age: 75
End: 2020-12-21
Payer: COMMERCIAL

## 2020-12-21 VITALS
DIASTOLIC BLOOD PRESSURE: 80 MMHG | SYSTOLIC BLOOD PRESSURE: 118 MMHG | WEIGHT: 110.8 LBS | HEIGHT: 61 IN | BODY MASS INDEX: 20.92 KG/M2

## 2020-12-21 DIAGNOSIS — Z01.411 ENCOUNTER FOR GYNECOLOGICAL EXAMINATION WITH ABNORMAL FINDING: Primary | ICD-10-CM

## 2020-12-21 DIAGNOSIS — N81.10 FEMALE BLADDER PROLAPSE: ICD-10-CM

## 2020-12-21 PROCEDURE — G0101 CA SCREEN;PELVIC/BREAST EXAM: HCPCS | Performed by: ADVANCED PRACTICE MIDWIFE

## 2020-12-21 PROCEDURE — G0145 SCR C/V CYTO,THINLAYER,RESCR: HCPCS | Performed by: ADVANCED PRACTICE MIDWIFE

## 2020-12-23 LAB
LAB AP GYN PRIMARY INTERPRETATION: NORMAL
Lab: NORMAL

## 2020-12-23 RX ORDER — TRAMADOL HYDROCHLORIDE 50 MG/1
TABLET ORAL
Qty: 90 TABLET | Refills: 0 | Status: SHIPPED | OUTPATIENT
Start: 2020-12-23 | End: 2021-01-02

## 2020-12-31 DIAGNOSIS — M19.90 ARTHRITIS: ICD-10-CM

## 2021-01-02 RX ORDER — TRAMADOL HYDROCHLORIDE 50 MG/1
TABLET ORAL
Qty: 90 TABLET | Refills: 0 | Status: SHIPPED | OUTPATIENT
Start: 2021-01-07 | End: 2021-01-15 | Stop reason: SDUPTHER

## 2021-01-04 ENCOUNTER — TELEPHONE (OUTPATIENT)
Dept: FAMILY MEDICINE CLINIC | Facility: CLINIC | Age: 76
End: 2021-01-04

## 2021-01-05 NOTE — TELEPHONE ENCOUNTER
Patient gets Tramadol filled at [facility]  A future script for 1/7/21 was electronically sent to [facility] on 1/2/21

## 2021-01-06 ENCOUNTER — OFFICE VISIT (OUTPATIENT)
Dept: OBGYN CLINIC | Facility: MEDICAL CENTER | Age: 76
End: 2021-01-06
Payer: COMMERCIAL

## 2021-01-06 VITALS
WEIGHT: 112.9 LBS | DIASTOLIC BLOOD PRESSURE: 74 MMHG | HEIGHT: 61 IN | BODY MASS INDEX: 21.32 KG/M2 | SYSTOLIC BLOOD PRESSURE: 116 MMHG

## 2021-01-06 DIAGNOSIS — N81.11 CYSTOCELE, MIDLINE: ICD-10-CM

## 2021-01-06 DIAGNOSIS — N81.10 FEMALE BLADDER PROLAPSE: Primary | ICD-10-CM

## 2021-01-06 PROCEDURE — 57160 INSERT PESSARY/OTHER DEVICE: CPT | Performed by: ADVANCED PRACTICE MIDWIFE

## 2021-01-06 PROCEDURE — A4561 PESSARY RUBBER, ANY TYPE: HCPCS | Performed by: ADVANCED PRACTICE MIDWIFE

## 2021-01-07 NOTE — PROGRESS NOTES
Pessary    Date/Time: 1/6/2021 2:37 PM  Performed by: Safia Michel CNM  Authorized by: Safia Michel CNM   Universal Protocol:  Consent: Verbal consent obtained  Risks and benefits: risks, benefits and alternatives were discussed  Consent given by: patient  Timeout called at: 1/6/2021 2:20 PM   Patient understanding: patient states understanding of the procedure being performed  Patient consent: the patient's understanding of the procedure matches consent given  Procedure consent: procedure consent matches procedure scheduled  Relevant documents: relevant documents present and verified  Patient identity confirmed: verbally with patient      Indication:     Indication for pessary: cystocele    Pre-procedure:     Pessary procedure type:  Fitting  Procedure:     Pessary type:  Ring w/ support    Pessary size:  3    Patient tolerance of procedure:   Tolerated well, no immediate complications

## 2021-01-08 ENCOUNTER — OFFICE VISIT (OUTPATIENT)
Dept: FAMILY MEDICINE CLINIC | Facility: CLINIC | Age: 76
End: 2021-01-08
Payer: COMMERCIAL

## 2021-01-08 VITALS
WEIGHT: 114 LBS | BODY MASS INDEX: 21.52 KG/M2 | TEMPERATURE: 97.5 F | HEIGHT: 61 IN | OXYGEN SATURATION: 100 % | HEART RATE: 96 BPM | SYSTOLIC BLOOD PRESSURE: 138 MMHG | DIASTOLIC BLOOD PRESSURE: 90 MMHG

## 2021-01-08 DIAGNOSIS — M06.9 RHEUMATOID ARTHRITIS OF BOTH WRISTS, UNSPECIFIED WHETHER RHEUMATOID FACTOR PRESENT (HCC): ICD-10-CM

## 2021-01-08 DIAGNOSIS — E78.5 HYPERLIPIDEMIA, UNSPECIFIED HYPERLIPIDEMIA TYPE: ICD-10-CM

## 2021-01-08 DIAGNOSIS — M25.539 PAIN IN WRIST, UNSPECIFIED LATERALITY: ICD-10-CM

## 2021-01-08 DIAGNOSIS — I10 ESSENTIAL HYPERTENSION: Primary | ICD-10-CM

## 2021-01-08 DIAGNOSIS — R01.1 NEWLY RECOGNIZED HEART MURMUR: ICD-10-CM

## 2021-01-08 PROCEDURE — 3725F SCREEN DEPRESSION PERFORMED: CPT | Performed by: FAMILY MEDICINE

## 2021-01-08 PROCEDURE — 1036F TOBACCO NON-USER: CPT | Performed by: FAMILY MEDICINE

## 2021-01-08 PROCEDURE — 1160F RVW MEDS BY RX/DR IN RCRD: CPT | Performed by: FAMILY MEDICINE

## 2021-01-08 PROCEDURE — 99214 OFFICE O/P EST MOD 30 MIN: CPT | Performed by: FAMILY MEDICINE

## 2021-01-08 RX ORDER — LIDOCAINE 50 MG/G
OINTMENT TOPICAL AS NEEDED
Qty: 35.44 G | Refills: 0 | Status: SHIPPED | OUTPATIENT
Start: 2021-01-08 | End: 2021-03-06

## 2021-01-08 NOTE — PROGRESS NOTES
Assessment/Plan:         Diagnoses and all orders for this visit:    Essential hypertension  Comments:  has been ideally controlled, today elevated slightly- monitor    Newly recognized heart murmur  Comments:  requires workup  Orders:  -     Echo complete with contrast if indicated; Future    Rheumatoid arthritis of both wrists, unspecified whether rheumatoid factor present [facility])  Comments:  pt defers rheumatologist referral    Pain in wrist, unspecified laterality  -     lidocaine (XYLOCAINE) 5 % ointment; Apply topically as needed for mild pain    Hyperlipidemia, unspecified hyperlipidemia type          Subjective:   Chief Complaint   Patient presents with    Follow-up        Patient ID: Cleve Greco is a 76 y o  female  Routine f/u  Left wrist continues to be problematic with pain and dysfunction, but she is hesitant to see ortho, as she expects they will want to operate, and she "doesn't want to go through that" - "I'm very active, don't want to be laid up with a cast on and be dependent on someone to do my hair or open my jars"   left shoulder is better "not perfect yet" - history of fall with Displaced fracture of proximal end of left humerus, managed by ortho      The following portions of the patient's history were reviewed and updated as appropriate: allergies, current medications, past family history, past medical history, past social history, past surgical history and problem list     Review of Systems   All other systems reviewed and are negative  Objective:      /90   Pulse 96   Temp 97 5 °F (36 4 °C)   Ht 5' 1" (1 549 m)   Wt 51 7 kg (114 lb)   SpO2 100%   BMI 21 54 kg/m²          Physical Exam  Vitals signs and nursing note reviewed  Constitutional:       General: She is not in acute distress  Appearance: She is well-developed  She is not ill-appearing, toxic-appearing or diaphoretic        Comments: Appears younger than stated age   HENT:      Head: Normocephalic and atraumatic  Eyes:      General: Lids are normal       Conjunctiva/sclera: Conjunctivae normal       Pupils: Pupils are equal, round, and reactive to light  Neck:      Musculoskeletal: Neck supple  Thyroid: No thyroid mass or thyromegaly  Vascular: No JVD  Trachea: Trachea normal    Cardiovascular:      Rate and Rhythm: Normal rate and regular rhythm  Pulses: Normal pulses  Heart sounds: S1 normal and S2 normal  Murmur present  Systolic murmur present with a grade of 2/6  Diastolic murmurs: new    No friction rub  No gallop  Comments: No edema  Pulmonary:      Effort: Pulmonary effort is normal       Breath sounds: Normal breath sounds  Abdominal:      General: Bowel sounds are normal  There is no distension  Palpations: Abdomen is soft  There is no hepatomegaly, splenomegaly or mass  Tenderness: There is no abdominal tenderness  Musculoskeletal:      Right lower leg: No edema  Left lower leg: No edema  Lymphadenopathy:      Cervical: No cervical adenopathy  Upper Body:      Right upper body: No supraclavicular adenopathy  Left upper body: No supraclavicular adenopathy  Skin:     General: Skin is warm and dry  Coloration: Skin is not pale  Neurological:      Mental Status: She is alert and oriented to person, place, and time  Gait: Gait normal    Psychiatric:         Mood and Affect: Mood normal          Behavior: Behavior normal  Behavior is cooperative

## 2021-01-15 DIAGNOSIS — M19.90 ARTHRITIS: ICD-10-CM

## 2021-01-15 NOTE — TELEPHONE ENCOUNTER
Patient requests Tramadol to [facility]  She forgot to tell Dr Coreen Mccoy that for the blood pressure pills she only takes one per day, not two  She feels she does not need two

## 2021-01-16 DIAGNOSIS — M19.90 ARTHRITIS: ICD-10-CM

## 2021-01-18 RX ORDER — TRAMADOL HYDROCHLORIDE 50 MG/1
50 TABLET ORAL EVERY 12 HOURS PRN
Qty: 75 TABLET | Refills: 0 | Status: SHIPPED | OUTPATIENT
Start: 2021-01-18 | End: 2021-02-08 | Stop reason: SDUPTHER

## 2021-01-20 RX ORDER — TRAMADOL HYDROCHLORIDE 50 MG/1
TABLET ORAL
Qty: 90 TABLET | OUTPATIENT
Start: 2021-01-20

## 2021-01-31 DIAGNOSIS — I10 ESSENTIAL HYPERTENSION: ICD-10-CM

## 2021-02-01 RX ORDER — AMLODIPINE BESYLATE 2.5 MG/1
TABLET ORAL
Qty: 60 TABLET | Refills: 1 | Status: SHIPPED | OUTPATIENT
Start: 2021-02-01

## 2021-02-05 ENCOUNTER — TELEPHONE (OUTPATIENT)
Dept: FAMILY MEDICINE CLINIC | Facility: CLINIC | Age: 76
End: 2021-02-05

## 2021-02-05 NOTE — TELEPHONE ENCOUNTER
Sylvia gallegos  [facility] stating the patient may need a prior auth through her [facility] insurance for echocardiogram scheduled for 02/09/2021      Phone:  [phone]

## 2021-02-08 DIAGNOSIS — M19.90 ARTHRITIS: ICD-10-CM

## 2021-02-08 NOTE — TELEPHONE ENCOUNTER
Patient calling in regards to a refill of Tramadol  Patient states that she is out of medication  Patient states that she takes the Tramadol 2-3 times daily  Informed patient that the medication is ordered for 2 times a day as needed  Patient states that it used to be written for 3 times daily and is asking provider to write the script for 3 times daily as she is in a lot of pain, especially with shoveling snow  Please advise

## 2021-02-08 NOTE — TELEPHONE ENCOUNTER
[address] at [phone] and spoke with Henrique Dhaliwal confirmed that no prior authorization was needed for the echocardiogram  Reference number [id number]  Called [facility] Cardiology and informed

## 2021-02-09 RX ORDER — TRAMADOL HYDROCHLORIDE 50 MG/1
TABLET ORAL
Qty: 75 TABLET | Refills: 0 | Status: SHIPPED | OUTPATIENT
Start: 2021-02-09 | End: 2021-03-03

## 2021-02-09 NOTE — TELEPHONE ENCOUNTER
Please advise pt that I a refilling now as 2-3x a day as needed, but we'll have to discuss again long term plan for her wrist problem at her next visit   Can't just be indefinitely on 2-3 tramadol every day

## 2021-02-09 NOTE — TELEPHONE ENCOUNTER
Patient is calling today asking about the increase of tramadol  Her wrist is killing her with shoveling  She is totally out and wants to  a refill  She needs it to be ordered for every two weeks  Patient states she was not aware that it was changed by Dr Servando Cuevas and now insurance won't cover it for another two weeks  She should have picked it up on Friday but cannot unless it is changed  Now it's snowing more and she has to go out to shovel  Her wrist is bone-on-bone and painful  Please advise

## 2021-02-12 DIAGNOSIS — Z23 ENCOUNTER FOR IMMUNIZATION: ICD-10-CM

## 2021-03-02 DIAGNOSIS — M19.90 ARTHRITIS: ICD-10-CM

## 2021-03-03 RX ORDER — TRAMADOL HYDROCHLORIDE 50 MG/1
TABLET ORAL
Qty: 75 TABLET | Refills: 0 | Status: SHIPPED | OUTPATIENT
Start: 2021-03-03 | End: 2021-04-26

## 2021-03-06 DIAGNOSIS — M25.539 PAIN IN WRIST, UNSPECIFIED LATERALITY: ICD-10-CM

## 2021-03-06 RX ORDER — LIDOCAINE 50 MG/G
OINTMENT TOPICAL AS NEEDED
Qty: 35.44 G | Refills: 0 | Status: SHIPPED | OUTPATIENT
Start: 2021-03-06

## 2021-04-23 DIAGNOSIS — M19.90 ARTHRITIS: ICD-10-CM

## 2021-04-26 RX ORDER — TRAMADOL HYDROCHLORIDE 50 MG/1
TABLET ORAL
Qty: 75 TABLET | Refills: 0 | Status: SHIPPED | OUTPATIENT
Start: 2021-04-26 | End: 2021-05-21

## 2021-05-19 DIAGNOSIS — M19.90 ARTHRITIS: ICD-10-CM

## 2021-05-21 DIAGNOSIS — K21.9 GASTROESOPHAGEAL REFLUX DISEASE WITHOUT ESOPHAGITIS: ICD-10-CM

## 2021-05-21 RX ORDER — ESOMEPRAZOLE MAGNESIUM 40 MG/1
CAPSULE, DELAYED RELEASE ORAL
Qty: 90 CAPSULE | Refills: 1 | Status: SHIPPED | OUTPATIENT
Start: 2021-05-21 | End: 2021-11-16

## 2021-05-21 RX ORDER — TRAMADOL HYDROCHLORIDE 50 MG/1
TABLET ORAL
Qty: 75 TABLET | Refills: 0 | Status: SHIPPED | OUTPATIENT
Start: 2021-05-21 | End: 2021-06-24 | Stop reason: SDUPTHER

## 2021-06-22 DIAGNOSIS — M19.90 ARTHRITIS: ICD-10-CM

## 2021-06-24 DIAGNOSIS — M19.90 ARTHRITIS: ICD-10-CM

## 2021-06-24 RX ORDER — TRAMADOL HYDROCHLORIDE 50 MG/1
TABLET ORAL
Qty: 75 TABLET | Refills: 0 | Status: SHIPPED | OUTPATIENT
Start: 2021-06-24 | End: 2021-08-09

## 2021-06-24 RX ORDER — TRAMADOL HYDROCHLORIDE 50 MG/1
TABLET ORAL
Qty: 75 TABLET | Refills: 0 | Status: CANCELLED | OUTPATIENT
Start: 2021-06-24

## 2021-08-30 DIAGNOSIS — M19.90 ARTHRITIS: ICD-10-CM

## 2021-09-02 ENCOUNTER — TELEPHONE (OUTPATIENT)
Dept: FAMILY MEDICINE CLINIC | Facility: CLINIC | Age: 76
End: 2021-09-02

## 2021-09-02 RX ORDER — TRAMADOL HYDROCHLORIDE 50 MG/1
TABLET ORAL
Qty: 90 TABLET | Refills: 0 | Status: SHIPPED | OUTPATIENT
Start: 2021-09-02 | End: 2021-09-30 | Stop reason: SDUPTHER

## 2021-09-27 ENCOUNTER — RA CDI HCC (OUTPATIENT)
Dept: OTHER | Facility: HOSPITAL | Age: 76
End: 2021-09-27

## 2021-09-27 NOTE — PROGRESS NOTES
[facility]  coding opportunities       Chart reviewed, no opportunity found: CHART REVIEWED, NO OPPORTUNITY FOUND                        Patients insurance company: [employer]

## 2021-09-30 DIAGNOSIS — M19.90 ARTHRITIS: ICD-10-CM

## 2021-10-04 RX ORDER — TRAMADOL HYDROCHLORIDE 50 MG/1
TABLET ORAL
Qty: 90 TABLET | Refills: 0 | Status: SHIPPED | OUTPATIENT
Start: 2021-10-04 | End: 2021-11-03

## 2021-11-03 DIAGNOSIS — M19.90 ARTHRITIS: ICD-10-CM

## 2021-11-04 ENCOUNTER — TELEPHONE (OUTPATIENT)
Dept: FAMILY MEDICINE CLINIC | Facility: CLINIC | Age: 76
End: 2021-11-04

## 2021-11-04 RX ORDER — TRAMADOL HYDROCHLORIDE 50 MG/1
TABLET ORAL
Qty: 90 TABLET | Refills: 0 | Status: SHIPPED | OUTPATIENT
Start: 2021-11-04 | End: 2021-12-06

## 2021-12-04 DIAGNOSIS — M19.90 ARTHRITIS: ICD-10-CM

## 2021-12-06 RX ORDER — TRAMADOL HYDROCHLORIDE 50 MG/1
TABLET ORAL
Qty: 90 TABLET | Refills: 0 | Status: SHIPPED | OUTPATIENT
Start: 2021-12-06 | End: 2021-12-30

## 2021-12-30 DIAGNOSIS — M19.90 ARTHRITIS: ICD-10-CM

## 2021-12-30 RX ORDER — TRAMADOL HYDROCHLORIDE 50 MG/1
TABLET ORAL
Qty: 90 TABLET | Refills: 0 | Status: SHIPPED | OUTPATIENT
Start: 2021-12-30 | End: 2022-01-25 | Stop reason: SDUPTHER

## 2021-12-30 RX ORDER — TRAMADOL HYDROCHLORIDE 50 MG/1
TABLET ORAL
Qty: 90 TABLET | Refills: 0 | OUTPATIENT
Start: 2021-12-30

## 2021-12-30 NOTE — TELEPHONE ENCOUNTER
Requested medication(s) are due for refill today:   Patient has already received a courtesy refill:   Other reason request has been forwarded to provider: Duplicate request

## 2022-01-25 DIAGNOSIS — M19.90 ARTHRITIS: ICD-10-CM

## 2022-01-28 RX ORDER — TRAMADOL HYDROCHLORIDE 50 MG/1
TABLET ORAL
Qty: 30 TABLET | Refills: 0 | Status: SHIPPED | OUTPATIENT
Start: 2022-01-28

## 2022-01-28 NOTE — TELEPHONE ENCOUNTER
When I called Pt for an appt she stated "I am not coming in and I can do without the Tramadol" and call was disconnected by Patient

## 2022-06-30 ENCOUNTER — TELEPHONE (OUTPATIENT)
Dept: OBGYN CLINIC | Facility: MEDICAL CENTER | Age: 77
End: 2022-06-30

## 2022-06-30 NOTE — TELEPHONE ENCOUNTER
We usually have pessary available, but would need to see what has changed since her last visit  I cannot from the not tell if she has been changing and cleaning pessary herself?

## 2022-06-30 NOTE — TELEPHONE ENCOUNTER
Pt called in to schedule pessary appt, wanted the size 4  Pt was last seen in January 2021 not sure if pt needs to be seen for an appt beforehand to measure or okay to come in for procedure appt  Please advise, thank you

## 2022-08-01 ENCOUNTER — PROCEDURE VISIT (OUTPATIENT)
Dept: OBGYN CLINIC | Facility: CLINIC | Age: 77
End: 2022-08-01
Payer: COMMERCIAL

## 2022-08-01 VITALS — DIASTOLIC BLOOD PRESSURE: 82 MMHG | SYSTOLIC BLOOD PRESSURE: 144 MMHG | BODY MASS INDEX: 23.2 KG/M2 | WEIGHT: 122.8 LBS

## 2022-08-01 DIAGNOSIS — N81.11 CYSTOCELE, MIDLINE: ICD-10-CM

## 2022-08-01 DIAGNOSIS — Z46.89 PESSARY MAINTENANCE: Primary | ICD-10-CM

## 2022-08-01 PROCEDURE — 99213 OFFICE O/P EST LOW 20 MIN: CPT | Performed by: ADVANCED PRACTICE MIDWIFE

## 2022-08-01 PROCEDURE — 1160F RVW MEDS BY RX/DR IN RCRD: CPT | Performed by: ADVANCED PRACTICE MIDWIFE

## 2022-08-01 NOTE — PROGRESS NOTES
[facility] [address] [place], [place]    Assessment/Plan:  No problem-specific Assessment & Plan notes found for this encounter  Diagnoses and all orders for this visit:    Pessary maintenance    Cystocele, midline    - Pessary cleaned and inserted without difficulty  -Discussed importance of removing and cleaning every 3 months  Reviewed information on removal, cleaning and reinserting  At this time Uyen Taylor is unsure if she wants to do herself  Given option of removing and cleaning a few days before next appointment and will check on proper reinsertion   - RTO 3 months  Subjective:   Beatrice Ivey is a 68 y o   female  CC: pessary cleaning    HPI: Uyen Taylor states that she has had pessary in since 2021  Removed it a few months ago and could not get it back in  Denies problems voiding, bowel pattern change, pelvic pain while in place  Uyen Taylor notes that she had intercourse a few times while it was in vagina  Removed it without problem and was aware that it should be removed every 3 months and cleaned, but did not realize it had been in that long  ROS: Review of Systems   Constitutional: Negative for activity change, appetite change, fatigue and fever  Respiratory: Negative for shortness of breath  Cardiovascular: Negative for palpitations and leg swelling  Gastrointestinal: Negative for constipation and diarrhea  Genitourinary: Negative for difficulty urinating, dyspareunia, frequency, urgency, vaginal bleeding, vaginal discharge and vaginal pain  Psychiatric/Behavioral: The patient is not nervous/anxious          PFSH: The following portions of the patient's history were reviewed and updated as appropriate: allergies, current medications, past family history, past medical history, obstetric history, gynecologic history, past social history, past surgical history and problem list        Objective:  /82   Wt 55 7 kg (122 lb 12 8 oz)   LMP  (LMP Unknown)   BMI 23 20 kg/m²    Physical Exam  Vitals reviewed  Pulmonary:      Effort: Pulmonary effort is normal    Genitourinary:     General: Normal vulva  Exam position: Lithotomy position  Labia:         Right: No rash, tenderness or lesion  Left: No rash, tenderness or lesion  Vagina: No signs of injury  Prolapsed vaginal walls present  No vaginal discharge, erythema, tenderness, bleeding or lesions  Comments: Vaginal walls without erosion, inflammation, lesions, or discharge  Pessary inspected and intact  Inserted without difficulty  Neurological:      Mental Status: She is alert and oriented to person, place, and time     Psychiatric:         Mood and Affect: Mood normal          Behavior: Behavior normal

## 2022-09-01 ENCOUNTER — RA CDI HCC (OUTPATIENT)
Dept: OTHER | Facility: HOSPITAL | Age: 77
End: 2022-09-01

## 2022-09-01 NOTE — PROGRESS NOTES
[address]  coding opportunities       Chart reviewed, no opportunity found: CHART REVIEWED, NO OPPORTUNITY FOUND        Patients Insurance     Medicare Insurance: [employer]

## 2022-11-08 ENCOUNTER — OFFICE VISIT (OUTPATIENT)
Dept: FAMILY MEDICINE CLINIC | Facility: CLINIC | Age: 77
End: 2022-11-08

## 2022-11-08 VITALS
TEMPERATURE: 97.6 F | WEIGHT: 120 LBS | OXYGEN SATURATION: 96 % | BODY MASS INDEX: 22.66 KG/M2 | SYSTOLIC BLOOD PRESSURE: 120 MMHG | DIASTOLIC BLOOD PRESSURE: 72 MMHG | HEIGHT: 61 IN | HEART RATE: 81 BPM | RESPIRATION RATE: 12 BRPM

## 2022-11-08 DIAGNOSIS — Z00.00 MEDICARE ANNUAL WELLNESS VISIT, SUBSEQUENT: Primary | ICD-10-CM

## 2022-11-08 DIAGNOSIS — E78.5 HYPERLIPIDEMIA, UNSPECIFIED HYPERLIPIDEMIA TYPE: ICD-10-CM

## 2022-11-08 DIAGNOSIS — K21.9 GASTROESOPHAGEAL REFLUX DISEASE WITHOUT ESOPHAGITIS: ICD-10-CM

## 2022-11-08 DIAGNOSIS — G89.29 OTHER CHRONIC PAIN: ICD-10-CM

## 2022-11-08 DIAGNOSIS — Z13.29 THYROID DISORDER SCREEN: ICD-10-CM

## 2022-11-08 DIAGNOSIS — I10 ESSENTIAL HYPERTENSION: ICD-10-CM

## 2022-11-08 DIAGNOSIS — Z13.0 SCREENING FOR DEFICIENCY ANEMIA: ICD-10-CM

## 2022-11-08 DIAGNOSIS — M06.9 RHEUMATOID ARTHRITIS OF BOTH WRISTS, UNSPECIFIED WHETHER RHEUMATOID FACTOR PRESENT (HCC): ICD-10-CM

## 2022-11-08 RX ORDER — ACETAMINOPHEN AND CODEINE PHOSPHATE 300; 30 MG/1; MG/1
1 TABLET ORAL EVERY 4 HOURS PRN
Qty: 30 TABLET | Refills: 0 | Status: SHIPPED | OUTPATIENT
Start: 2022-11-08

## 2022-11-08 NOTE — PROGRESS NOTES
Assessment and Plan:     Problem List Items Addressed This Visit        Digestive    Gastroesophageal reflux disease without esophagitis    Relevant Medications    esomeprazole (NexIUM) 20 mg capsule       Cardiovascular and Mediastinum    Essential hypertension    Relevant Orders    TSH, 3rd generation with Free T4 reflex       Musculoskeletal and Integument    Rheumatoid arthritis of both wrists (HCC)    Relevant Orders    TSH, 3rd generation with Free T4 reflex       Other    Medicare annual wellness visit, subsequent - Primary    Hyperlipidemia    Relevant Orders    Lipid panel    Comprehensive metabolic panel      Other Visit Diagnoses     Other chronic pain        Thyroid disorder screen        Relevant Orders    TSH, 3rd generation with Free T4 reflex    Screening for deficiency anemia        Relevant Orders    CBC and differential          Depression Screening and Follow-up Plan: Patient was screened for depression during today's encounter  They screened negative with a PHQ-2 score of 0  Preventive health issues were discussed with patient, and age appropriate screening tests were ordered as noted in patient's After Visit Summary  Personalized health advice and appropriate referrals for health education or preventive services given if needed, as noted in patient's After Visit Summary       History of Present Illness:     Chief Complaint   Patient presents with   • Medicare Wellness Visit     Subsequent   • Follow-up       Patient presents for a Medicare Wellness Visit and f/u    Here for Medicare AWV and f/u visit  She is due for repeat DEXA_ +osteoporosis for which fosamax was rx'd in 2020, but pt never started due to "too many restrictions with taking the med"- she politely declines repeat DEXA  Pt states she hasn't had a car for 2 years- switched her pharmacy to [facility] because they will deliver her rx's   "was hard to get here today even"  She has been out of all of her meds  "Tramadol wasn't really helping me- can I get codeine or something else?- knuckles are all (deformed) - mostly my hands hurt"       Patient Care Team:  Weston Cordero,  as PCP - [place] [address], DO as PCP - [address] [place], [place] [id number] [facility]     Review of Systems:     Review of Systems     Problem List:     Patient Active Problem List   Diagnosis   • Arthritis   • Deformity of left wrist joint   • Female bladder prolapse   • [place] (hard of hearing)   • Left wrist pain   • Osteoarthritis of knee   • Postmenopausal vaginal bleeding   • Rheumatoid arthritis involving multiple sites ([facility] )   • Thyroid disorder   • Colonoscopy refused   • Gastroesophageal reflux disease without esophagitis   • Medicare annual wellness visit, subsequent   • Vaginal mucous membrane ulceration   • Essential hypertension   • 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   • Mass of left lower extremity   • Rheumatoid arthritis of both wrists ([facility] )   • Newly recognized heart murmur   • Hyperlipidemia   • Pain in wrist      Past Medical and Surgical History:     Past Medical History:   Diagnosis Date   • Lyme disease      Past Surgical History:   Procedure Laterality Date   •  SECTION      x2   • REPLACEMENT TOTAL KNEE Left 2015   • REPLACEMENT TOTAL KNEE Right    • THYROID SURGERY      Age 21       Family History:     Family History   Problem Relation Age of Onset   • Arthritis Mother    • Hypertension Mother    • Cancer Mother    • Osteoporosis Mother    • Alzheimer's disease Father    • Arthritis Sister    • Osteoporosis Sister    • Rheum arthritis Sister    • Arthritis Son    • Leukemia Son    • Cancer Other       Social History:     Social History     Socioeconomic History   • Marital status:      Spouse name: None   • Number of children: 2   • Years of education: None   • Highest education level: None   Occupational History   • Occupation: Retired     Comment: At Age 76   • Occupation: [employer]   • Smoking status: Never Smoker   • Smokeless tobacco: Never Used   • Tobacco comment: Passive Smoke exposure   Vaping Use   • Vaping Use: Never used   Substance and Sexual Activity   • Alcohol use: No   • Drug use: No   • Sexual activity: Yes     Partners: Male     Birth control/protection: Post-menopausal   Other Topics Concern   • None   Social History Narrative    Always uses seat belt     Social Determinants of Health     Financial Resource Strain: Low Risk    • Difficulty of Paying Living Expenses: Not hard at all   Food Insecurity: Not on file   Transportation Needs: No Transportation Needs   • Lack of Transportation (Medical): No   • Lack of Transportation (Non-Medical): No   Physical Activity: Not on file   Stress: Not on file   Social Connections: Not on file   Intimate Partner Violence: Not on file   Housing Stability: Not on file      Medications and Allergies:     Current Outpatient Medications   Medication Sig Dispense Refill   • esomeprazole (NexIUM) 20 mg capsule Take 1 capsule (20 mg total) by mouth daily 90 capsule 1     No current facility-administered medications for this visit  Allergies   Allergen Reactions   • Codeine GI Intolerance   • Pollen Extract Sneezing      Immunizations: There is no immunization history for the selected administration types on file for this patient  Health Maintenance:         Topic Date Due   • Breast Cancer Screening: Mammogram  Never done   • Hepatitis C Screening  Completed     There are no preventive care reminders to display for this patient  Medicare Screening Tests and Risk Assessments:     Tobias Cody is here for her Subsequent Wellness visit  Health Risk Assessment:   Patient rates overall health as fair  Patient feels that their physical health rating is slightly worse  Patient is very satisfied with their life  Eyesight was rated as same  Hearing was rated as much worse   Patient feels that their emotional and mental health rating is much better  Patients states they are sometimes angry  Patient states they are often unusually tired/fatigued  Pain experienced in the last 7 days has been a lot  Patient's pain rating has been 10/10  Patient states that she has experienced weight loss or gain in last 6 months  Depression Screening:   PHQ-2 Score: 0      Fall Risk Screening: In the past year, patient has experienced: no history of falling in past year      Urinary Incontinence Screening:   Patient has not leaked urine accidently in the last six months  Home Safety:  Patient does not have trouble with stairs inside or outside of their home  Patient has working smoke alarms and has working carbon monoxide detector  Home safety hazards include: none  Nutrition:   Current diet is Regular  Medications:   Patient is not currently taking any over-the-counter supplements  Patient is able to manage medications  Activities of Daily Living (ADLs)/Instrumental Activities of Daily Living (IADLs):   Walk and transfer into and out of bed and chair?: Yes  Dress and groom yourself?: Yes    Bathe or shower yourself?: Yes    Feed yourself?  Yes  Do your laundry/housekeeping?: Yes  Manage your money, pay your bills and track your expenses?: Yes  Make your own meals?: Yes    Do your own shopping?: Yes    Previous Hospitalizations:   Any hospitalizations or ED visits within the last 12 months?: No      Advance Care Planning:   Living will: Yes    Durable POA for healthcare: No    Advanced directive: No    Advanced directive counseling given: Yes    Five wishes given: Yes      Cognitive Screening:   Provider or family/friend/caregiver concerned regarding cognition?: No    PREVENTIVE SCREENINGS      Cardiovascular Screening:    General: Screening Not Indicated and History Lipid Disorder      Diabetes Screening:     General: Risks and Benefits Discussed    Due for: Blood Glucose      Colorectal Cancer Screening:     General: Risks and Benefits Discussed and Patient Declines      Breast Cancer Screening:     General: Risks and Benefits Discussed and Patient Declines      Cervical Cancer Screening:    General: Screening Not Indicated      Osteoporosis Screening:    General: Risks and Benefits Discussed and Patient Declines      Abdominal Aortic Aneurysm (AAA) Screening:        General: Screening Not Indicated      Lung Cancer Screening:     General: Screening Not Indicated      Hepatitis C Screening:    General: Screening Current    Screening, Brief Intervention, and Referral to Treatment (SBIRT)    Screening  Typical number of drinks in a day: 0  Typical number of drinks in a week: 0  Interpretation: Low risk drinking behavior      AUDIT-C Screenin) How often did you have a drink containing alcohol in the past year? never  2) How many drinks did you have on a typical day when you were drinking in the past year? 0  3) How often did you have 6 or more drinks on one occasion in the past year? never    AUDIT-C Score: 0  Interpretation: Score 0-2 (female): Negative screen for alcohol misuse    Single Item Drug Screening:  How often have you used an illegal drug (including marijuana) or a prescription medication for non-medical reasons in the past year? never    Single Item Drug Screen Score: 0  Interpretation: Negative screen for possible drug use disorder    No exam data present     Physical Exam:     /72 (BP Location: Left arm, Patient Position: Sitting, Cuff Size: Standard)   Pulse 81   Temp 97 6 °F (36 4 °C) (Tympanic)   Resp 12   Ht 5' 1" (1 549 m)   Wt 54 4 kg (120 lb)   LMP  (LMP Unknown)   SpO2 96%   BMI 22 67 kg/m²     Physical Exam     Merline Rainwater, DO

## 2022-11-08 NOTE — PROGRESS NOTES
Assessment/Plan     Problem List Items Addressed This Visit        Digestive    Gastroesophageal reflux disease without esophagitis    Relevant Medications    esomeprazole (NexIUM) 20 mg capsule       Cardiovascular and Mediastinum    Essential hypertension    Relevant Orders    TSH, 3rd generation with Free T4 reflex       Musculoskeletal and Integument    Rheumatoid arthritis of both wrists (HCC)    Relevant Medications    acetaminophen-codeine (TYLENOL #3) 300-30 mg per tablet    Other Relevant Orders    TSH, 3rd generation with Free T4 reflex       Other    Medicare annual wellness visit, subsequent - Primary    Hyperlipidemia    Relevant Orders    Lipid panel    Comprehensive metabolic panel      Other Visit Diagnoses     Other chronic pain        Relevant Medications    acetaminophen-codeine (TYLENOL #3) 300-30 mg per tablet    Other Relevant Orders    [facility] All Prescribed Meds and Special Instructions    Amphetamines, Methamphetamines    Butalbital    Phenobarbital    Secobarbital    Temazepam    Alprazolam    Clonazepam    Diazepam    Lorazepam    Oxazepam    Gabapentin    Pregabalin    Cocaine    Heroin    Buprenorphine    Levorphanol    Meperidine    Naltrexone    Fentanyl    Methadone    Oxycodone    Oxymorphone    Tapentadol    THC    Tramadol    Codeine, Hydrocodone, Hydropmorphone, Morphine    Bath Salts    Ethyl Glucuronide/Ethyl Sulfate    Kratom    Spice    Methylphenidate    Phentermine    Validity Oxidant    Validity Creatinine    Validity pH    Validity Specific    Thyroid disorder screen        Relevant Orders    TSH, 3rd generation with Free T4 reflex    Screening for deficiency anemia        Relevant Orders    CBC and differential             Treatment Goals: Maintain/improve quality of life, relieve pain    Opiate risks  There are risks associated with opioid medications, including dependence, addiction and tolerance   The patient understands and agrees to use these medications only as prescribed  Potential side effects of the medications include, but are not limited to, constipation, drowsiness, addiction, impaired judgment and risk of fatal overdose if not taken as prescribed  The patient was warned against driving while taking sedation medications  Sharing medications is a felony  At this point in time, the patient is showing no signs of addiction, abuse, diversion or suicidal ideation  Opioid agreement  Pain management agreement was reviewed with patient and signed/updated during visit      Drug screen  Drug screen collected during today's visit      PDMP review  [place] or [place]  reviewed   No red flags were identified; safe to proceed with prescription          Subjective     Opioid Management:   Type of visit: Initial    Pain related diagnoses: rheumatoid arthritis    Had been using tramadol for hand/wrist pains due to rheum arthritis, but ran out, now reports that, "Tramadol wasn't really helping me- can I get codeine or something else?- knuckles are all (deformed) - mostly my hands hurt"  She has been out of all of her meds due to not having a car, did find that [facility] will deliver rx's  Pt couldn't come in for a visit to sign controlled substance agreement for the Tramadol until now due to dependent on others for transportation  I discuss with pt that fact that codeine is flagged as a medication that caused GI upset in the past for pt, she reports that she "doesn't think it was too bad," and would like to try the med      Current pain description: Both hands and wrists    Social Support System  Patient receives support from their: other ()    Screening Tools/Assessments:    PHQ-2/9:  PHQ-2 score: 0      Opioid agreement:  Active Opioid agreement on file?: Yes    Opioid agreement signed date: 11/8/2022  Opioid agreement expiration date: 11/8/2023    Naloxone:  Currently prescribed Naloxone (Narcan): No      HPI  Pain Medications             acetaminophen-codeine (TYLENOL #3) 300-30 mg per tablet Take 1 tablet by mouth every 4 (four) hours as needed for moderate pain         Outpatient Morphine Milligram Equivalents Per Day     11/8/22 and after 27 MME/Day    Order Name Dose Route Frequency Maximum MME/Day     acetaminophen-codeine (TYLENOL #3) 300-30 mg per tablet 1 tablet Oral Every 4 hours PRN 27 MME/Day    Total Potential Morphine Milligram Equivalents Per Day 27 MME/Day    Calculation Information        acetaminophen-codeine (TYLENOL #3) 300-30 mg per tablet    acetaminophen-codeine 300-30 mg Tabs: single dose of 30 mg of opioid in combo * 6 doses per day * morphine equivalence factor of 0 15 = 27 MME/Day                         PDMP Review       Value Time User    PDMP Reviewed  Yes 11/8/2022  1:09 PM Josue Quintana DO         Review of Systems  Objective     /72 (BP Location: Left arm, Patient Position: Sitting, Cuff Size: Standard)   Pulse 81   Temp 97 6 °F (36 4 °C) (Tympanic)   Resp 12   Ht 5' 1" (1 549 m)   Wt 54 4 kg (120 lb)   LMP  (LMP Unknown)   SpO2 96%   BMI 22 67 kg/m²     Physical Exam  Vitals and nursing note reviewed  Constitutional:       General: She is not in acute distress  Appearance: She is not ill-appearing, toxic-appearing or diaphoretic  Comments: Vibrant and pleasant as always   HENT:      Head: Normocephalic and atraumatic  Nose: Nose normal       Mouth/Throat:      Mouth: Mucous membranes are moist    Eyes:      Conjunctiva/sclera: Conjunctivae normal    Pulmonary:      Effort: Pulmonary effort is normal    Skin:     Coloration: Skin is not pale  Neurological:      General: No focal deficit present  Mental Status: She is alert and oriented to person, place, and time  Gait: Gait normal    Psychiatric:         Attention and Perception: Attention normal          Mood and Affect: Mood and affect normal          Behavior: Behavior normal  Behavior is cooperative  Thought Content:  Thought content normal  Cognition and Memory: Cognition normal          Judgment: Judgment normal          Estephania Seat, DO        Answers for HPI/ROS submitted by the patient on 11/1/2022  How would you rate your overall health?: fair  Compared to last year, how is your physical health?: slightly worse  In general, how satisfied are you with your life?: very satisfied  Compared to last year, how is your eyesight?: same  Compared to last year, how is your hearing?: slightly worse  Compared to last year, how is your emotional/mental health?: much better  How often is anger a problem for you?: sometimes  How often do you feel unusually tired/fatigued?: often  In the past 7 days, how much pain have you experienced?: a lot  If you answered "some" or "a lot", please rate the severity of your pain on a scale of 1 to 10 (1 being the least severe pain and 10 being the most intense pain) : 10/10  In the past 6 months, have you lost or gained 10 pounds without trying?: Yes  One or more falls in the last year: No  In the past 6 months, have you accidentally leaked urine?: No  Do you have trouble with the stairs inside or outside your home?: No  Does your home have working smoke alarms?: Yes  Does your home have a carbon monoxide monitor?: Yes  Which safety hazards (if any) have you experienced in your home? Please select all that apply : none  How would you describe your current diet?  Please select all that apply : Regular  In addition to prescription medications, are you taking any over-the-counter supplements?: No  Can you manage your medications?: Yes  Are you currently taking any opioid medications?: No  Can you walk and transfer into and out of your bed and chair?: Yes  Can you dress and groom yourself?: Yes  Can you bathe or shower yourself?: Yes  Can you feed yourself?: Yes  Can you do your laundry/ housekeeping?: Yes  Can you manage your money, pay your bills, and track your expenses?: Yes  Can you make your own meals?: Yes  Can you do your own shopping?: Yes  Within the last 12 months, have you had any hospitalizations or Emergency Department visits?: No  Do you have a living will?: Yes  Do you have a Durable POA (Power of ) for healthcare decisions?: No  Do you have an Advanced Directive for end of life decisions?: No  How often have you used an illegal drug (including marijuana) or a prescription medication for non-medical reasons in the past year?: never  What is the typical number of drinks you consume in a day?: 0  What is the typical number of drinks you consume in a week?: 0  How often did you have a drink containing alcohol in the past year?: never  How many drinks did you have on a typical day  when you were drinking in the past year?: 0  How often did you have 6 or more drinks on one occasion in the past year?: never

## 2022-11-08 NOTE — PATIENT INSTRUCTIONS
Goals of care:  Maximize your health and quality of life by:   · Increasing your level of function and activity  · Decreasing the negative effects of pain on your life  · Minimizing the risks and side effects of medications and ensuring safe use of opioid medication     Ways for you to help meet your goals:  Maintain a healthy lifestyle  This includes proper nutrition, regular physical activity as able, try for 8 hours of sleep per night, use stress reduction strategies, avoid triggers  Risks and side effects of opioid use:  Prescription opioids carry serious risks of addiction and  overdose, especially with prolonged use  An opioid overdose,  often marked by slowed breathing, can cause sudden death  The  use of prescription opioids can have a number of side effects as  well, even when taken as directed:  · Tolerance--meaning you might need to take more of a medication for the same pain relief  · Physical dependence--meaning you have symptoms of withdrawal when a medication is stopped  · Increased sensitivity to pain  · Constipation  · Nausea, vomiting, dry mouth  · Sleepiness and dizziness   · Confusion  · Depression  · Low levels of testosterone that can result in lower sex drive, energy, and strength  · Itching and sweating    If you are prescribed opioids for pain:  · Never take opioids in greater amounts or more often than prescribed  · Help prevent misuse and abuse  - Never sell or share prescription opioids         - Never use another person’s prescription opioids  · ‡Store prescription opioids in a secure place and out of reach of others (this may include visitors, children, friends, and family)  · Safely dispose of unused prescription opioids: Find your community drug take-back program or your pharmacy mail-back program, or flush them down the toilet, following guidance from the Food and Drug Administration (www fda gov/Drugs/ResourcesForYou)    · ‡Visit www cdc gov/drugoverdose to learn about the risks of opioid abuse and overdose  · If you believe you may be struggling with addiction, tell your health care provider and ask for guidance or call [facility]’s National Helpline at 3-086-530-HELP  Goals of care:  Maximize your health and quality of life by: Increasing your level of function and activity  Decreasing the negative effects of pain on your life  Minimizing the risks and side effects of medications and ensuring safe use of opioid medication     Ways for you to help meet your goals:  Maintain a healthy lifestyle  This includes proper nutrition, regular physical activity as able, try for 8 hours of sleep per night, use stress reduction strategies, avoid triggers  Risks and side effects of opioid use:  Prescription opioids carry serious risks of addiction and  overdose, especially with prolonged use  An opioid overdose,  often marked by slowed breathing, can cause sudden death  The  use of prescription opioids can have a number of side effects as  well, even when taken as directed: Tolerance--meaning you might need to take more of a medication for the same pain relief  Physical dependence--meaning you have symptoms of withdrawal when a medication is stopped  Increased sensitivity to pain  Constipation  Nausea, vomiting, dry mouth  Sleepiness and dizziness   Confusion  Depression  Low levels of testosterone that can result in lower sex drive, energy, and strength  Itching and sweating    If you are prescribed opioids for pain:  Never take opioids in greater amounts or more often than prescribed  Help prevent misuse and abuse  - Never sell or share prescription opioids         - Never use another person’s prescription opioids  ‡Store prescription opioids in a secure place and out of reach of others (this may include visitors, children, friends, and family)    Safely dispose of unused prescription opioids: Find your community drug take-back program or your pharmacy mail-back program, or flush them down the toilet, following guidance from the Food and Drug Administration (www fda gov/Drugs/ResourcesForYou)  ‡Visit www cdc gov/drugoverdose to learn about the risks of opioid abuse and overdose  If you believe you may be struggling with addiction, tell your health care provider and ask for guidance or call [facility]’s National Helpline at 1-915-416-HELP  Goals of care:  Maximize your health and quality of life by: Increasing your level of function and activity  Decreasing the negative effects of pain on your life  Minimizing the risks and side effects of medications and ensuring safe use of opioid medication     Ways for you to help meet your goals:  Maintain a healthy lifestyle  This includes proper nutrition, regular physical activity as able, try for 8 hours of sleep per night, use stress reduction strategies, avoid triggers  Risks and side effects of opioid use:  Prescription opioids carry serious risks of addiction and  overdose, especially with prolonged use  An opioid overdose,  often marked by slowed breathing, can cause sudden death  The  use of prescription opioids can have a number of side effects as  well, even when taken as directed: Tolerance--meaning you might need to take more of a medication for the same pain relief  Physical dependence--meaning you have symptoms of withdrawal when a medication is stopped  Increased sensitivity to pain  Constipation  Nausea, vomiting, dry mouth  Sleepiness and dizziness   Confusion  Depression  Low levels of testosterone that can result in lower sex drive, energy, and strength  Itching and sweating    If you are prescribed opioids for pain:  Never take opioids in greater amounts or more often than prescribed  Help prevent misuse and abuse  - Never sell or share prescription opioids         - Never use another person’s prescription opioids    ‡Store prescription opioids in a secure place and out of reach of others (this may include visitors, children, friends, and family)  Safely dispose of unused prescription opioids: Find your community drug take-back program or your pharmacy mail-back program, or flush them down the toilet, following guidance from the Food and Drug Administration (www fda gov/Drugs/ResourcesForYou)  ‡Visit www cdc gov/drugoverdose to learn about the risks of opioid abuse and overdose  If you believe you may be struggling with addiction, tell your health care provider and ask for guidance or call [facility]’s National Helpline at 4-194-318-KUMU

## 2022-11-09 ENCOUNTER — TELEPHONE (OUTPATIENT)
Dept: FAMILY MEDICINE CLINIC | Facility: CLINIC | Age: 77
End: 2022-11-09

## 2022-11-09 DIAGNOSIS — J30.9 ALLERGIC RHINITIS, UNSPECIFIED SEASONALITY, UNSPECIFIED TRIGGER: ICD-10-CM

## 2022-11-09 DIAGNOSIS — J30.9 ALLERGIC RHINITIS, UNSPECIFIED SEASONALITY, UNSPECIFIED TRIGGER: Primary | ICD-10-CM

## 2022-11-09 RX ORDER — DESLORATADINE 5 MG/1
5 TABLET ORAL DAILY
Qty: 30 TABLET | Refills: 1 | Status: SHIPPED | OUTPATIENT
Start: 2022-11-09 | End: 2022-11-09 | Stop reason: SDUPTHER

## 2022-11-09 NOTE — TELEPHONE ENCOUNTER
Pt called asking to change the pharmacy to [facility] instead of [facility]  Pt states she had spoken to [facility] and cancelled the order  Clarinex ( non drowsy)  Pt aware provider is gone for the day will not be answered today  Please resend order  Than k you

## 2022-11-09 NOTE — TELEPHONE ENCOUNTER
Pt called states she was seen in the office for an appointment  She is requesting for allergy medication  Something that doesn't make her tired as per patient

## 2022-11-10 DIAGNOSIS — J30.9 ALLERGIC RHINITIS, UNSPECIFIED SEASONALITY, UNSPECIFIED TRIGGER: ICD-10-CM

## 2022-11-10 RX ORDER — DESLORATADINE 5 MG/1
5 TABLET ORAL DAILY
Qty: 30 TABLET | Refills: 1 | Status: CANCELLED | OUTPATIENT
Start: 2022-11-10

## 2022-11-10 RX ORDER — DESLORATADINE 5 MG/1
5 TABLET ORAL DAILY
Qty: 30 TABLET | Refills: 1 | Status: SHIPPED | OUTPATIENT
Start: 2022-11-10 | End: 2022-11-10

## 2022-11-10 RX ORDER — LEVOCETIRIZINE DIHYDROCHLORIDE 5 MG/1
5 TABLET, FILM COATED ORAL EVERY EVENING
Qty: 30 TABLET | Refills: 1 | Status: SHIPPED | OUTPATIENT
Start: 2022-11-10

## 2022-11-10 NOTE — TELEPHONE ENCOUNTER
Per refill encounter dated today, 11/10, Clarinex is not covered by insurance  A message was sent to provider to approve Levocetirizine

## 2022-11-10 NOTE — TELEPHONE ENCOUNTER
Autumn Garces called from [facility] states desloratadine is not covered under the pt's drug plan  Pharmacy requesting medication change to Levocetirizine       Please note I accidentally discontinued Clarinex

## 2022-11-18 LAB

## 2022-11-25 ENCOUNTER — TELEPHONE (OUTPATIENT)
Dept: FAMILY MEDICINE CLINIC | Facility: CLINIC | Age: 77
End: 2022-11-25

## 2022-11-25 NOTE — TELEPHONE ENCOUNTER
Pt called states she would like to discontinue taking tylenol and codeine  She would like to go on Tramadol instead  Does this patient have to be seen to change the medication? Pt is aware can take up to 72 hours for medication to be approved

## 2022-11-25 NOTE — TELEPHONE ENCOUNTER
Patient seen in office on 11/8/22 where Tramadol was discontinued and Tylenol with Codeine was prescribed in replace of  Patient is now asking to go back to Tramadol  Please advise

## 2022-11-30 ENCOUNTER — TELEPHONE (OUTPATIENT)
Dept: FAMILY MEDICINE CLINIC | Facility: CLINIC | Age: 77
End: 2022-11-30

## 2022-11-30 DIAGNOSIS — M06.9 RHEUMATOID ARTHRITIS OF BOTH WRISTS, UNSPECIFIED WHETHER RHEUMATOID FACTOR PRESENT (HCC): ICD-10-CM

## 2022-11-30 DIAGNOSIS — M25.539 PAIN IN WRIST, UNSPECIFIED LATERALITY: Primary | ICD-10-CM

## 2022-11-30 DIAGNOSIS — M25.539 PAIN IN WRIST, UNSPECIFIED LATERALITY: ICD-10-CM

## 2022-11-30 RX ORDER — TRAMADOL HYDROCHLORIDE 50 MG/1
100 TABLET ORAL 3 TIMES DAILY PRN
Qty: 90 TABLET | Refills: 0 | Status: SHIPPED | OUTPATIENT
Start: 2022-11-30

## 2022-11-30 RX ORDER — TRAMADOL HYDROCHLORIDE 50 MG/1
100 TABLET ORAL 3 TIMES DAILY PRN
Qty: 90 TABLET | Refills: 0 | Status: SHIPPED | OUTPATIENT
Start: 2022-11-30 | End: 2022-11-30 | Stop reason: SDUPTHER

## 2022-11-30 NOTE — TELEPHONE ENCOUNTER
Pt called states she would like her medication of tramadol to go to [facility] instead of [facility] because she has no way of getting her medication  She is asking for the order to be cancelled  She would like [facility] to delivery the medication to her on a Friday cant be without medication she is asking to call it into the pharamcy

## 2022-12-29 DIAGNOSIS — M06.9 RHEUMATOID ARTHRITIS OF BOTH WRISTS, UNSPECIFIED WHETHER RHEUMATOID FACTOR PRESENT (HCC): ICD-10-CM

## 2022-12-29 DIAGNOSIS — M25.539 PAIN IN WRIST, UNSPECIFIED LATERALITY: ICD-10-CM

## 2022-12-30 RX ORDER — TRAMADOL HYDROCHLORIDE 50 MG/1
100 TABLET ORAL 3 TIMES DAILY PRN
Qty: 90 TABLET | Refills: 0 | Status: SHIPPED | OUTPATIENT
Start: 2022-12-30

## 2023-02-27 DIAGNOSIS — K21.9 GASTROESOPHAGEAL REFLUX DISEASE WITHOUT ESOPHAGITIS: ICD-10-CM

## 2023-02-27 NOTE — TELEPHONE ENCOUNTER
Pt called states she wants something stronger then the Tramadol  Pt made aware provider not in the office until 3/2  Pt cancelled upcoming appt for 3/8/23  Encouraged pt to keep appt, provider will not change medication w/o an appt  Pt  refused to reschedule appt

## 2024-01-03 DIAGNOSIS — K21.9 GASTROESOPHAGEAL REFLUX DISEASE WITHOUT ESOPHAGITIS: ICD-10-CM

## 2024-01-03 NOTE — TELEPHONE ENCOUNTER
Count includes the [facility]'s pharmacy called into the office requesting new script. Patient was last seen in Nov of 2022.

## 2024-01-03 NOTE — TELEPHONE ENCOUNTER
Requested medication(s) are due for refill today: Yes  Patient has already received a courtesy refill: No  Other reason request has been forwarded to provider: Referring to Lizzie's message below, Tiffanie called in requesting a new script of med. Should pt be seen first?

## 2024-01-04 NOTE — TELEPHONE ENCOUNTER
Unable to lmom for pt due to full mailbox. Attempted to scheudle pt for f/u due to pt overdue for f/u

## 2024-01-22 ENCOUNTER — TELEMEDICINE (OUTPATIENT)
Dept: FAMILY MEDICINE CLINIC | Facility: CLINIC | Age: 79
End: 2024-01-22

## 2024-01-22 ENCOUNTER — RA CDI HCC (OUTPATIENT)
Dept: OTHER | Facility: HOSPITAL | Age: 79
End: 2024-01-22

## 2024-01-22 DIAGNOSIS — K21.9 GASTROESOPHAGEAL REFLUX DISEASE WITHOUT ESOPHAGITIS: ICD-10-CM

## 2024-01-22 DIAGNOSIS — M06.9 RHEUMATOID ARTHRITIS OF BOTH WRISTS, UNSPECIFIED WHETHER RHEUMATOID FACTOR PRESENT (HCC): ICD-10-CM

## 2024-01-22 DIAGNOSIS — M25.539 PAIN IN WRIST, UNSPECIFIED LATERALITY: ICD-10-CM

## 2024-01-22 RX ORDER — TRAMADOL HYDROCHLORIDE 50 MG/1
100 TABLET ORAL 3 TIMES DAILY PRN
Qty: 90 TABLET | Refills: 0 | Status: CANCELLED | OUTPATIENT
Start: 2024-01-22

## 2024-01-22 NOTE — PROGRESS NOTES
Virtual AWV Consent    Verification of patient location:    Patient is located at {Amb Virtual Patient Location:[address]} in the following state in which I hold an active license { amb virtual patient location:[address]}    The patient was identified by name and date of birth. Blanca Brewer was informed that this is a telemedicine visit and that the visit is being conducted through {AMB VIRTUAL VISIT MEDIUM:11560}.  {Telemedicine confidentiality :93725} {Telemedicine participants:75997}  She acknowledged consent and understanding of privacy and security of the video platform. The patient has agreed to participate and understands they can discontinue the visit at any time.    Patient is aware this is a billable service.     Reason for visit is ***    Encounter provider Tracey Lee DO    Provider located at [facility]  FAMILY PRACTICE AT [address]  [facility] [address]      Recent Visits  No visits were found meeting these conditions.  Showing recent visits within past 7 days and meeting all other requirements  Today's Visits  Date Type Provider Dept   01/22/24 Telemedicine Tracey Lee DO [facility]   Showing today's visits and meeting all other requirements  Future Appointments  No visits were found meeting these conditions.  Showing future appointments within next 150 days and meeting all other requirements           Visit Time  Total Visit Duration: ***   Assessment and Plan:     Problem List Items Addressed This Visit    None       Preventive health issues were discussed with patient, and age appropriate screening tests were ordered as noted in patient's After Visit Summary.  Personalized health advice and appropriate referrals for health education or preventive services given if needed, as noted in patient's After Visit Summary.     History of Present Illness:     Patient presents for a Medicare Wellness Visit    HPI   Patient Care Team:  Tracey  DO Rosa as PCP - General Tracey Lee DO as PCP - PCP-[facility] ([facility])     Review of Systems:     Review of Systems     Problem List:     Patient Active Problem List   Diagnosis    Arthritis    Deformity of left wrist joint    Female bladder prolapse    [place] (hard of hearing)    Left wrist pain    Osteoarthritis of knee    Postmenopausal vaginal bleeding    Rheumatoid arthritis involving multiple sites (HCC)    Thyroid disorder    Colonoscopy refused    Gastroesophageal reflux disease without esophagitis    Medicare annual wellness visit, subsequent    Vaginal mucous membrane ulceration    Essential hypertension    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    Mass of left lower extremity    Rheumatoid arthritis of both wrists (HCC)    Newly recognized heart murmur    Hyperlipidemia    Pain in wrist      Past Medical and Surgical History:     Past Medical History:   Diagnosis Date    Lyme disease      Past Surgical History:   Procedure Laterality Date     SECTION      x2    REPLACEMENT TOTAL KNEE Left 2015    REPLACEMENT TOTAL KNEE Right     THYROID SURGERY      Age 23       Family History:     Family History   Problem Relation Age of Onset    Arthritis Mother     Hypertension Mother     Cancer Mother     Osteoporosis Mother     Alzheimer's disease Father     Arthritis Sister     Osteoporosis Sister     Rheum arthritis Sister     Arthritis Son     Leukemia Son     Cancer Other       Social History:     Social History     Socioeconomic History    Marital status:      Spouse name: Not on file    Number of children: 2    Years of education: Not on file    Highest education level: Not on file   Occupational History    Occupation: Retired     Comment: At Age 68    Occupation: House Cleaning   Tobacco Use    Smoking status: Never    Smokeless tobacco: Never    Tobacco comments:     Passive Smoke exposure   Vaping Use    Vaping status: Never Used   Substance and  Sexual Activity    Alcohol use: No    Drug use: No    Sexual activity: Yes     Partners: Male     Birth control/protection: Post-menopausal   Other Topics Concern    Not on file   Social History Narrative    Always uses seat belt     Social Determinants of Health     Financial Resource Strain: Low Risk  (11/8/2022)    Overall Financial Resource Strain (CARDIA)     Difficulty of Paying Living Expenses: Not hard at all   Recent Concern: Financial Resource Strain - Medium Risk (11/1/2022)    Overall Financial Resource Strain (CARDIA)     Difficulty of Paying Living Expenses: Somewhat hard   Food Insecurity: Not on file   Transportation Needs: No Transportation Needs (11/8/2022)    PRAPARE - Transportation     Lack of Transportation (Medical): No     Lack of Transportation (Non-Medical): No   Recent Concern: Transportation Needs - Unmet Transportation Needs (11/1/2022)    PRAPARE - Transportation     Lack of Transportation (Medical): Yes     Lack of Transportation (Non-Medical): Yes   Physical Activity: Unknown (9/25/2020)    Exercise Vital Sign     Days of Exercise per Week: 7 days     Minutes of Exercise per Session: Not on file   Stress: Not on file   Social Connections: Not on file   Intimate Partner Violence: Not on file   Housing Stability: Not on file      Medications and Allergies:     Current Outpatient Medications   Medication Sig Dispense Refill    esomeprazole (NexIUM) 20 mg capsule Take 1 capsule (20 mg total) by mouth daily 90 capsule 1    levocetirizine (XYZAL) 5 MG tablet Take 1 tablet (5 mg total) by mouth every evening 30 tablet 1    traMADol (Ultram) 50 mg tablet Take 2 tablets (100 mg total) by mouth 3 (three) times a day as needed for moderate pain or severe pain 90 tablet 0     No current facility-administered medications for this visit.     Allergies   Allergen Reactions    Codeine GI Intolerance    Pollen Extract Sneezing      Immunizations:     There is no immunization history for the selected  administration types on file for this patient.   Health Maintenance:         Topic Date Due    Breast Cancer Screening: Mammogram  Never done    Hepatitis C Screening  Completed    Colorectal Cancer Screening  Discontinued         Topic Date Due    COVID-19 Vaccine (1) Never done    Pneumococcal Vaccine: 65+ Years (1 - PCV) Never done    Influenza Vaccine (1) Never done      Medicare Screening Tests and Risk Assessments:     Blanca is here for her Subsequent Wellness visit.     Health Risk Assessment:   Patient rates overall health as good. Patient feels that their physical health rating is slightly worse. Patient is satisfied with their life. Eyesight was rated as same. Hearing was rated as same. Patient feels that their emotional and mental health rating is slightly better. Patients states they are never, rarely angry. Patient states they are sometimes unusually tired/fatigued. Pain experienced in the last 7 days has been some. Patient's pain rating has been 8/10. Patient states that she has experienced no weight loss or gain in last 6 months.     Depression Screening:   PHQ-2 Score: 0      Fall Risk Screening:   In the past year, patient has experienced: no history of falling in past year      Urinary Incontinence Screening:   Patient has not leaked urine accidently in the last six months.     Home Safety:  Patient does not have trouble with stairs inside or outside of their home. Patient has working smoke alarms and has working carbon monoxide detector. Home safety hazards include: none.     Nutrition:   Current diet is Regular.     Medications:   Patient is not currently taking any over-the-counter supplements. Patient is able to manage medications.     Activities of Daily Living (ADLs)/Instrumental Activities of Daily Living (IADLs):   Walk and transfer into and out of bed and chair?: Yes  Dress and groom yourself?: Yes    Bathe or shower yourself?: Yes    Feed yourself? Yes  Do your laundry/housekeeping?:  Yes  Manage your money, pay your bills and track your expenses?: Yes  Make your own meals?: Yes    Do your own shopping?: Yes    Previous Hospitalizations:   Any hospitalizations or ED visits within the last 12 months?: No      Advance Care Planning:   Living will: Yes    Durable POA for healthcare: No    Advanced directive: Yes      PREVENTIVE SCREENINGS      Cardiovascular Screening:    General: Screening Not Indicated and History Lipid Disorder      Cervical Cancer Screening:    General: Screening Not Indicated      Lung Cancer Screening:     General: Screening Not Indicated      Hepatitis C Screening:    General: Screening Current    Screening, Brief Intervention, and Referral to Treatment (SBIRT)    Screening  Typical number of drinks in a day: 0  Typical number of drinks in a week: 0  Interpretation: Low risk drinking behavior.    Single Item Drug Screening:  How often have you used an illegal drug (including marijuana) or a prescription medication for non-medical reasons in the past year? never    Single Item Drug Screen Score: 0  Interpretation: Negative screen for possible drug use disorder    Review of Current Opioid Use    Opioid Risk Tool (ORT) Interpretation: Complete Opioid Risk Tool (ORT)    No results found.     Physical Exam:     LMP  (LMP Unknown)     Physical Exam     Tracey Lee DO

## 2024-01-22 NOTE — PATIENT INSTRUCTIONS
Medicare Preventive Visit Patient Instructions  Thank you for completing your Welcome to Medicare Visit or Medicare Annual Wellness Visit today. Your next wellness visit will be due in one year (1/22/2025).  The screening/preventive services that you may require over the next 5-10 years are detailed below. Some tests may not apply to you based off risk factors and/or age. Screening tests ordered at today's visit but not completed yet may show as past due. Also, please note that scanned in results may not display below.  Preventive Screenings:  Service Recommendations Previous Testing/Comments   Colorectal Cancer Screening  * Colonoscopy    * Fecal Occult Blood Test (FOBT)/Fecal Immunochemical Test (FIT)  * Fecal DNA/Cologuard Test  * Flexible Sigmoidoscopy Age: 45-75 years old   Colonoscopy: every 10 years (may be performed more frequently if at higher risk)  OR  FOBT/FIT: every 1 year  OR  Cologuard: every 3 years  OR  Sigmoidoscopy: every 5 years  Screening may be recommended earlier than age 45 if at higher risk for colorectal cancer. Also, an individualized decision between you and your healthcare provider will decide whether screening between the ages of 76-85 would be appropriate. Colonoscopy: Not on file  FOBT/FIT: Not on file  Cologuard: Not on file  Sigmoidoscopy: Not on file          Breast Cancer Screening Age: 40+ years old  Frequency: every 1-2 years  Not required if history of left and right mastectomy Mammogram: Not on file        Cervical Cancer Screening Between the ages of 21-29, pap smear recommended once every 3 years.   Between the ages of 30-65, can perform pap smear with HPV co-testing every 5 years.   Recommendations may differ for women with a history of total hysterectomy, cervical cancer, or abnormal pap smears in past. Pap Smear: 12/21/2020        Hepatitis C Screening Once for adults born between 1945 and 1965  More frequently in patients at high risk for Hepatitis C Hep C Antibody:  05/31/2019        Diabetes Screening 1-2 times per year if you're at risk for diabetes or have pre-diabetes Fasting glucose: 83 mg/dL (9/28/2020)  A1C: No results in last 5 years (No results in last 5 years)      Cholesterol Screening Once every 5 years if you don't have a lipid disorder. May order more often based on risk factors. Lipid panel: 09/28/2020          Other Preventive Screenings Covered by Medicare:  Abdominal Aortic Aneurysm (AAA) Screening: covered once if your at risk. You're considered to be at risk if you have a family history of AAA.  Lung Cancer Screening: covers low dose CT scan once per year if you meet all of the following conditions: (1) Age 55-77; (2) No signs or symptoms of lung cancer; (3) Current smoker or have quit smoking within the last 15 years; (4) You have a tobacco smoking history of at least 20 pack years (packs per day multiplied by number of years you smoked); (5) You get a written order from a healthcare provider.  Glaucoma Screening: covered annually if you're considered high risk: (1) You have diabetes OR (2) Family history of glaucoma OR (3)  aged 50 and older OR (4)  American aged 65 and older  Osteoporosis Screening: covered every 2 years if you meet one of the following conditions: (1) You're estrogen deficient and at risk for osteoporosis based off medical history and other findings; (2) Have a vertebral abnormality; (3) On glucocorticoid therapy for more than 3 months; (4) Have primary hyperparathyroidism; (5) On osteoporosis medications and need to assess response to drug therapy.   Last bone density test (DXA Scan): 09/29/2020.  HIV Screening: covered annually if you're between the age of 15-65. Also covered annually if you are younger than 15 and older than 65 with risk factors for HIV infection. For pregnant patients, it is covered up to 3 times per pregnancy.    Immunizations:  Immunization Recommendations   Influenza Vaccine Annual  influenza vaccination during flu season is recommended for all persons aged >= 6 months who do not have contraindications   Pneumococcal Vaccine   * Pneumococcal conjugate vaccine = PCV13 (Prevnar 13), PCV15 (Vaxneuvance), PCV20 (Prevnar 20)  * Pneumococcal polysaccharide vaccine = PPSV23 (Pneumovax) Adults 19-65 yo with certain risk factors or if 65+ yo  If never received any pneumonia vaccine: recommend Prevnar 20 (PCV20)  Give PCV20 if previously received 1 dose of PCV13 or PPSV23   Hepatitis B Vaccine 3 dose series if at intermediate or high risk (ex: diabetes, end stage renal disease, liver disease)   Respiratory syncytial virus (RSV) Vaccine - COVERED BY MEDICARE PART D  * RSVPreF3 (Arexvy) CDC recommends that adults 60 years of age and older may receive a single dose of RSV vaccine using shared clinical decision-making (SCDM)   Tetanus (Td) Vaccine - COST NOT COVERED BY MEDICARE PART B Following completion of primary series, a booster dose should be given every 10 years to maintain immunity against tetanus. Td may also be given as tetanus wound prophylaxis.   Tdap Vaccine - COST NOT COVERED BY MEDICARE PART B Recommended at least once for all adults. For pregnant patients, recommended with each pregnancy.   Shingles Vaccine (Shingrix) - COST NOT COVERED BY MEDICARE PART B  2 shot series recommended in those 19 years and older who have or will have weakened immune systems or those 50 years and older     Health Maintenance Due:      Topic Date Due   • Breast Cancer Screening: Mammogram  Never done   • Hepatitis C Screening  Completed   • Colorectal Cancer Screening  Discontinued     Immunizations Due:      Topic Date Due   • COVID-19 Vaccine (1) Never done   • Pneumococcal Vaccine: 65+ Years (1 - PCV) Never done   • Influenza Vaccine (1) Never done     Advance Directives   What are advance directives?  Advance directives are legal documents that state your wishes and plans for medical care. These plans are made  ahead of time in case you lose your ability to make decisions for yourself. Advance directives can apply to any medical decision, such as the treatments you want, and if you want to donate organs.   What are the types of advance directives?  There are many types of advance directives, and each state has rules about how to use them. You may choose a combination of any of the following:  Living will:  This is a written record of the treatment you want. You can also choose which treatments you do not want, which to limit, and which to stop at a certain time. This includes surgery, medicine, IV fluid, and tube feedings.   Durable power of  for healthcare (DPAHC):  This is a written record that states who you want to make healthcare choices for you when you are unable to make them for yourself. This person, called a proxy, is usually a family member or a friend. You may choose more than 1 proxy.  Do not resuscitate (DNR) order:  A DNR order is used in case your heart stops beating or you stop breathing. It is a request not to have certain forms of treatment, such as CPR. A DNR order may be included in other types of advance directives.  Medical directive:  This covers the care that you want if you are in a coma, near death, or unable to make decisions for yourself. You can list the treatments you want for each condition. Treatment may include pain medicine, surgery, blood transfusions, dialysis, IV or tube feedings, and a ventilator (breathing machine).  Values history:  This document has questions about your views, beliefs, and how you feel and think about life. This information can help others choose the care that you would choose.  Why are advance directives important?  An advance directive helps you control your care. Although spoken wishes may be used, it is better to have your wishes written down. Spoken wishes can be misunderstood, or not followed. Treatments may be given even if you do not want them. An  advance directive may make it easier for your family to make difficult choices about your care.   Urinary Incontinence   Urinary incontinence (UI)  is when you lose control of your bladder. UI develops because your bladder cannot store or empty urine properly. The 3 most common types of UI are stress incontinence, urge incontinence, or both.  Medicines:   May be given to help strengthen your bladder control. Report any side effects of medication to your healthcare provider.  Do pelvic muscle exercises often:  Your pelvic muscles help you stop urinating. Squeeze these muscles tight for 5 seconds, then relax for 5 seconds. Gradually work up to squeezing for 10 seconds. Do 3 sets of 15 repetitions a day, or as directed. This will help strengthen your pelvic muscles and improve bladder control.  Train your bladder:  Go to the bathroom at set times, such as every 2 hours, even if you do not feel the urge to go. You can also try to hold your urine when you feel the urge to go. For example, hold your urine for 5 minutes when you feel the urge to go. As that becomes easier, hold your urine for 10 minutes.   Self-care:   Keep a UI record.  Write down how often you leak urine and how much you leak. Make a note of what you were doing when you leaked urine.  Drink liquids as directed. You may need to limit the amount of liquid you drink to help control your urine leakage. Do not drink any liquid right before you go to bed. Limit or do not have drinks that contain caffeine or alcohol.   Prevent constipation.  Eat a variety of high-fiber foods. Good examples are high-fiber cereals, beans, vegetables, and whole-grain breads. Walking is the best way to trigger your intestines to have a bowel movement.  Exercise regularly and maintain a healthy weight.  Weight loss and exercise will decrease pressure on your bladder and help you control your leakage.   Use a catheter as directed  to help empty your bladder. A catheter is a tiny,  plastic tube that is put into your bladder to drain your urine.   Go to behavior therapy as directed.  Behavior therapy may be used to help you learn to control your urge to urinate.     © Copyright [employer] 2018 Information is for End User's use only and may not be sold, redistributed or otherwise used for commercial purposes. All illustrations and images included in CareNotes® are the copyrighted property of [employer] or [employer]

## 2024-01-23 ENCOUNTER — TELEPHONE (OUTPATIENT)
Age: 79
End: 2024-01-23

## 2024-01-23 DIAGNOSIS — M06.9 RHEUMATOID ARTHRITIS OF BOTH WRISTS, UNSPECIFIED WHETHER RHEUMATOID FACTOR PRESENT (HCC): ICD-10-CM

## 2024-01-23 DIAGNOSIS — M25.539 PAIN IN WRIST, UNSPECIFIED LATERALITY: ICD-10-CM

## 2024-01-23 DIAGNOSIS — K21.9 GASTROESOPHAGEAL REFLUX DISEASE WITHOUT ESOPHAGITIS: ICD-10-CM

## 2024-01-23 RX ORDER — TRAMADOL HYDROCHLORIDE 50 MG/1
100 TABLET ORAL 3 TIMES DAILY PRN
Qty: 90 TABLET | Refills: 0 | OUTPATIENT
Start: 2024-01-23

## 2024-01-23 NOTE — TELEPHONE ENCOUNTER
Patient was on the phone yesterday for a virtual, was speaking with someone who said they were putting the doctor on but it didn't happen. Please call

## 2024-01-23 NOTE — TELEPHONE ENCOUNTER
Patient must have visits here at minimum once a year for f/u GERD/get refills of the Nexium rx, and Medicare requests all recipients have an Annual Medicare Wellness Visit  She has not had visit here since 11/2022

## 2024-01-23 NOTE — TELEPHONE ENCOUNTER
Refill must be reviewed and completed by the office or provider. The refill is unable to be approved by the medication management team.    Cannot be delegated  Patient not seen since 11/8/22  Patient is schedule tomorrow 1/24/24 for appt

## 2024-01-23 NOTE — TELEPHONE ENCOUNTER
Pt would need to come in for opioid management visit, including urine drug screening, if she is again requesting Tramadol rx (she requested this rx when MA was rooming by telephone for visit yesterday)

## 2024-01-23 NOTE — TELEPHONE ENCOUNTER
Call placed to pt-- relayed to pt that we were having technical difficulties and that we apologize. I did reschedule pt for tomorrow at 10 am. She still requested a virtual as she does not have a car. I said that is find as long as she has her camera on so we can see her. Pt verbally understood.

## 2024-01-23 NOTE — TELEPHONE ENCOUNTER
Reason for call:   [x] Refill   [] Prior Auth  [] Other:     Office:   [x] PCP/Provider -   [] Specialty/Provider -     Medication: nexium 20 mg, one tab daily, 90 tablets  Tramadol 50 mg, TID PRN      Pharmacy: [facility]     Does the patient have enough for 3 days?   [] Yes   [x] No - Send as HP to POD

## 2024-01-23 NOTE — TELEPHONE ENCOUNTER
Call placed to pt-- pt verbally understood and said to cancel the appt tomorrow then. Pt relayed she is unable to come in for an appt so she just will not get her Tramadol filled. I also relayed I would send a message back to  asking if the appt tomorrow should be cancelled of if she still would like to connect to her. Please advise

## 2024-01-24 ENCOUNTER — TELEPHONE (OUTPATIENT)
Dept: FAMILY MEDICINE CLINIC | Facility: CLINIC | Age: 79
End: 2024-01-24

## 2024-01-24 NOTE — TELEPHONE ENCOUNTER
Pt had virtual appt with  today, 1/24, at 10 am. Called pt to answer AWV questions, pt states she thought the appt was going to be canceled since  refilled her stomach medication. I relayed it is still required that she follows up with her PCP and that she will need an IN PERSON visit for her Tramadol to be filled.    Will call to reschedule appt since pt is not available now because she thought the appt was getting canceled.

## 2024-01-29 ENCOUNTER — TELEPHONE (OUTPATIENT)
Dept: FAMILY MEDICINE CLINIC | Facility: CLINIC | Age: 79
End: 2024-01-29

## 2024-02-21 PROBLEM — Z00.00 MEDICARE ANNUAL WELLNESS VISIT, SUBSEQUENT: Status: RESOLVED | Noted: 2018-09-24 | Resolved: 2024-02-21

## 2024-02-26 DIAGNOSIS — K21.9 GASTROESOPHAGEAL REFLUX DISEASE WITHOUT ESOPHAGITIS: ICD-10-CM

## 2024-06-12 ENCOUNTER — TELEPHONE (OUTPATIENT)
Dept: FAMILY MEDICINE CLINIC | Facility: CLINIC | Age: 79
End: 2024-06-12

## 2024-06-14 NOTE — TELEPHONE ENCOUNTER
Call placed to pt-- pt does not have a car to get anywhere. Pt will call back for appt once she is able to pay for uber or [employer].

## 2024-06-20 ENCOUNTER — RA CDI HCC (OUTPATIENT)
Dept: OTHER | Facility: HOSPITAL | Age: 79
End: 2024-06-20

## 2024-06-26 ENCOUNTER — OFFICE VISIT (OUTPATIENT)
Dept: FAMILY MEDICINE CLINIC | Facility: CLINIC | Age: 79
End: 2024-06-26
Payer: COMMERCIAL

## 2024-06-26 VITALS
TEMPERATURE: 98 F | BODY MASS INDEX: 22.45 KG/M2 | HEART RATE: 80 BPM | DIASTOLIC BLOOD PRESSURE: 76 MMHG | OXYGEN SATURATION: 98 % | SYSTOLIC BLOOD PRESSURE: 114 MMHG | WEIGHT: 122 LBS | HEIGHT: 62 IN

## 2024-06-26 DIAGNOSIS — M25.532 LEFT WRIST PAIN: ICD-10-CM

## 2024-06-26 DIAGNOSIS — G89.29 OTHER CHRONIC PAIN: ICD-10-CM

## 2024-06-26 DIAGNOSIS — J02.9 PHARYNGITIS, UNSPECIFIED ETIOLOGY: ICD-10-CM

## 2024-06-26 DIAGNOSIS — R59.0 ANTERIOR CERVICAL LYMPHADENOPATHY: ICD-10-CM

## 2024-06-26 DIAGNOSIS — M06.9 RHEUMATOID ARTHRITIS OF BOTH WRISTS, UNSPECIFIED WHETHER RHEUMATOID FACTOR PRESENT (HCC): Primary | ICD-10-CM

## 2024-06-26 DIAGNOSIS — H91.93 BILATERAL HEARING LOSS, UNSPECIFIED HEARING LOSS TYPE: ICD-10-CM

## 2024-06-26 DIAGNOSIS — M06.9 RHEUMATOID ARTHRITIS INVOLVING MULTIPLE SITES, UNSPECIFIED WHETHER RHEUMATOID FACTOR PRESENT (HCC): ICD-10-CM

## 2024-06-26 DIAGNOSIS — Z79.891 OPIOID USE AGREEMENT EXISTS: ICD-10-CM

## 2024-06-26 PROCEDURE — 99215 OFFICE O/P EST HI 40 MIN: CPT | Performed by: FAMILY MEDICINE

## 2024-06-26 PROCEDURE — G2211 COMPLEX E/M VISIT ADD ON: HCPCS | Performed by: FAMILY MEDICINE

## 2024-06-26 RX ORDER — AMOXICILLIN 500 MG/1
500 CAPSULE ORAL EVERY 8 HOURS SCHEDULED
Qty: 30 CAPSULE | Refills: 0 | Status: SHIPPED | OUTPATIENT
Start: 2024-06-26 | End: 2024-07-06

## 2024-06-26 RX ORDER — IBUPROFEN 200 MG
TABLET ORAL EVERY 6 HOURS PRN
COMMUNITY

## 2024-06-26 RX ORDER — TRAMADOL HYDROCHLORIDE 50 MG/1
50 TABLET ORAL EVERY 8 HOURS PRN
Qty: 90 TABLET | Refills: 0 | Status: SHIPPED | OUTPATIENT
Start: 2024-06-26

## 2024-06-26 RX ORDER — TRAMADOL HYDROCHLORIDE 50 MG/1
100 TABLET ORAL 3 TIMES DAILY PRN
Qty: 90 TABLET | Refills: 0 | Status: SHIPPED | OUTPATIENT
Start: 2024-06-26 | End: 2024-06-26

## 2024-06-26 NOTE — PROGRESS NOTES
"Ambulatory Visit  Name: Blanca Brewer      : 1945      MRN: [id number]  Encounter Provider: Tracey Lee DO  Encounter Date: 2024   Encounter department: [facility] [place]    Assessment & Plan   1. Encounter for screening mammogram for breast cancer    Opioid Management Plan        History of Present Illness   {Disappearing Hyperlinks I Encounters * My Last Note * Since Last Visit * History :17459}  Opioid Management HPI  Outpatient Morphine Milligram Equivalents Per Day     24 and after 30 MME/Day    Order Name Dose Route Frequency Maximum MME/Day     traMADol (Ultram) 50 mg tablet 100 mg Oral 3 times daily PRN 30 MME/Day    Total Potential Morphine Milligram Equivalents Per Day 30 MME/Day    Calculation Information        traMADol (Ultram) 50 mg tablet    traMADol 50 mg Tabs: single dose of 100 mg * 3 doses per day * morphine equivalence factor of 0.1 = 30 MME/Day                         PDMP Review       Value Time User    PDMP Reviewed  Yes 2024  8:45 AM Bisi Harmon         Review of Systems  Objective   {Disappearing Hyperlinks   Review Vitals * Enter New Vitals * Results Review * Labs * Imaging * Cardiology * Procedures * Lung Cancer Screening :47076}  /76 (BP Location: Left arm, Patient Position: Sitting, Cuff Size: Adult)   Pulse 80   Temp 98 °F (36.7 °C)   Ht 5' 2\" (1.575 m)   Wt 55.3 kg (122 lb)   LMP  (LMP Unknown)   SpO2 98%   BMI 22.31 kg/m²     Physical Exam  Administrative Statements {Disappearing Hyperlinks I  Level of Service * PCMH/PCSP:98403}  {Time Spent Statement (Optional):97929}              "

## 2024-06-26 NOTE — PATIENT INSTRUCTIONS
Goals of care:  Maximize your health and quality of life by:   Increasing your level of function and activity  Decreasing the negative effects of pain on your life  Minimizing the risks and side effects of medications and ensuring safe use of opioid medication     Ways for you to help meet your goals:  Maintain a healthy lifestyle. This includes proper nutrition, regular physical activity as able, try for 8 hours of sleep per night, use stress reduction strategies, avoid triggers.      Risks and side effects of opioid use:  Prescription opioids carry serious risks of addiction and  overdose, especially with prolonged use. An opioid overdose,  often marked by slowed breathing, can cause sudden death. The  use of prescription opioids can have a number of side effects as  well, even when taken as directed:  Tolerance--meaning you might need to take more of a medication for the same pain relief  Physical dependence--meaning you have symptoms of withdrawal when a medication is stopped  Increased sensitivity to pain  Constipation  Nausea, vomiting, dry mouth  Sleepiness and dizziness   Confusion  Depression  Low levels of testosterone that can result in lower sex drive, energy, and strength  Itching and sweating    If you are prescribed opioids for pain:  Never take opioids in greater amounts or more often than prescribed.  Help prevent misuse and abuse.        - Never sell or share prescription opioids.        - Never use another person’s prescription opioids.  ‡Store prescription opioids in a secure place and out of reach of others (this may include visitors, children, friends, and family).  Safely dispose of unused prescription opioids: Find your community drug take-back program or your pharmacy mail-back program, or flush them down the toilet, following guidance from the Food and Drug Administration (www.fda.gov/Drugs/ResourcesForYou).  ‡Visit www.cdc.gov/drugoverdose to learn about the risks of opioid abuse and  overdose.  If you believe you may be struggling with addiction, tell your health care provider and ask for guidance or call [facility]’s National Helpline at 5-768-905-SCDJ.   Goals of care:  Maximize your health and quality of life by:   Increasing your level of function and activity  Decreasing the negative effects of pain on your life  Minimizing the risks and side effects of medications and ensuring safe use of opioid medication     Ways for you to help meet your goals:  Maintain a healthy lifestyle. This includes proper nutrition, regular physical activity as able, try for 8 hours of sleep per night, use stress reduction strategies, avoid triggers.      Risks and side effects of opioid use:  Prescription opioids carry serious risks of addiction and  overdose, especially with prolonged use. An opioid overdose,  often marked by slowed breathing, can cause sudden death. The  use of prescription opioids can have a number of side effects as  well, even when taken as directed:  Tolerance--meaning you might need to take more of a medication for the same pain relief  Physical dependence--meaning you have symptoms of withdrawal when a medication is stopped  Increased sensitivity to pain  Constipation  Nausea, vomiting, dry mouth  Sleepiness and dizziness   Confusion  Depression  Low levels of testosterone that can result in lower sex drive, energy, and strength  Itching and sweating    If you are prescribed opioids for pain:  Never take opioids in greater amounts or more often than prescribed.  Help prevent misuse and abuse.        - Never sell or share prescription opioids.        - Never use another person’s prescription opioids.  ‡Store prescription opioids in a secure place and out of reach of others (this may include visitors, children, friends, and family).  Safely dispose of unused prescription opioids: Find your community drug take-back program or your pharmacy mail-back program, or flush them down the toilet,  following guidance from the Food and Drug Administration (www.fda.gov/Drugs/ResourcesForYou).  ‡Visit www.cdc.gov/drugoverdose to learn about the risks of opioid abuse and overdose.  If you believe you may be struggling with addiction, tell your health care provider and ask for guidance or call [facility]’s National Helpline at 3-940-503-NXOC.

## 2024-06-26 NOTE — PROGRESS NOTES
Ambulatory Visit  Name: Blanca Brewer      : 1945      MRN: [id number]  Encounter Provider: Tracey Lee DO  Encounter Date: 2024   Encounter department: [facility]    Assessment & Plan   1. Rheumatoid arthritis of both wrists, unspecified whether rheumatoid factor present (HCC)  -     [facility] All Prescribed Meds and Special Instructions  -     Amphetamines, Methamphetamines  -     Butalbital  -     Phenobarbital  -     Secobarbital  -     Alprazolam  -     Clonazepam  -     Diazepam, Temazepam, Oxazepam  -     Lorazepam  -     Gabapentin  -     Pregabalin  -     Cocaine  -     Heroin  -     Buprenorphine  -     Levorphanol  -     Meperidine  -     Naltrexone  -     Fentanyl  -     Methadone  -     Oxycodone  -     Tapentadol  -     THC  -     Tramadol  -     Codeine, Hydrocodone, Hydropmorphone, Morphine  -     Bath Salts  -     Ethyl Glucuronide/Ethyl Sulfate  -     Kratom  -     Spice  -     Methylphenidate  -     Phentermine  -     Validity Oxidant  -     Validity Creatinine  -     Validity pH  -     Validity Specific  -     Xylazine Definitive Test  -     traMADol (Ultram) 50 mg tablet; Take 1 tablet (50 mg total) by mouth every 8 (eight) hours as needed for moderate pain  2. Left wrist pain  -     [facility] All Prescribed Meds and Special Instructions  -     Amphetamines, Methamphetamines  -     Butalbital  -     Phenobarbital  -     Secobarbital  -     Alprazolam  -     Clonazepam  -     Diazepam, Temazepam, Oxazepam  -     Lorazepam  -     Gabapentin  -     Pregabalin  -     Cocaine  -     Heroin  -     Buprenorphine  -     Levorphanol  -     Meperidine  -     Naltrexone  -     Fentanyl  -     Methadone  -     Oxycodone  -     Tapentadol  -     THC  -     Tramadol  -     Codeine, Hydrocodone, Hydropmorphone, Morphine  -     Bath Salts  -     Ethyl Glucuronide/Ethyl Sulfate  -     Kratom  -     Spice  -     Methylphenidate  -     Phentermine  -     Validity  Oxidant  -     Validity Creatinine  -     Validity pH  -     Validity Specific  -     Xylazine Definitive Test  -     traMADol (Ultram) 50 mg tablet; Take 1 tablet (50 mg total) by mouth every 8 (eight) hours as needed for moderate pain  3. Rheumatoid arthritis involving multiple sites, unspecified whether rheumatoid factor present (HCC)  4. Opioid use agreement exists  5. Other chronic pain  -     [facility] All Prescribed Meds and Special Instructions  -     Amphetamines, Methamphetamines  -     Butalbital  -     Phenobarbital  -     Secobarbital  -     Alprazolam  -     Clonazepam  -     Diazepam, Temazepam, Oxazepam  -     Lorazepam  -     Gabapentin  -     Pregabalin  -     Cocaine  -     Heroin  -     Buprenorphine  -     Levorphanol  -     Meperidine  -     Naltrexone  -     Fentanyl  -     Methadone  -     Oxycodone  -     Tapentadol  -     THC  -     Tramadol  -     Codeine, Hydrocodone, Hydropmorphone, Morphine  -     Bath Salts  -     Ethyl Glucuronide/Ethyl Sulfate  -     Kratom  -     Spice  -     Methylphenidate  -     Phentermine  -     Validity Oxidant  -     Validity Creatinine  -     Validity pH  -     Validity Specific  -     Xylazine Definitive Test  -     traMADol (Ultram) 50 mg tablet; Take 1 tablet (50 mg total) by mouth every 8 (eight) hours as needed for moderate pain  6. Pharyngitis, unspecified etiology  -     amoxicillin (AMOXIL) 500 mg capsule; Take 1 capsule (500 mg total) by mouth every 8 (eight) hours for 10 days  7. Anterior cervical lymphadenopathy  -     amoxicillin (AMOXIL) 500 mg capsule; Take 1 capsule (500 mg total) by mouth every 8 (eight) hours for 10 days  8. Bilateral hearing loss, unspecified hearing loss type    Treatment Plan: Resume PRN tramadol thx    Treatment Goals: Maintain/improve quality of life, relieve pain    Opiate risks  There are risks associated with opioid medications, including dependence, addiction and tolerance. The patient understands and agrees to use  these medications only as prescribed. Potential side effects of the medications include, but are not limited to, constipation, drowsiness, addiction, impaired judgment and risk of fatal overdose if not taken as prescribed. The patient was warned against driving while taking sedation medications.  Sharing medications is a felony. At this point in time, the patient is showing no signs of addiction, abuse, diversion or suicidal ideation.      Patient has a high risk condition (age > 65, ANI, renal or hepatic impairment, mental health condition, use of alcohol or other substances). Has been counseled on the specific risks of taking opioids with these conditions and the increased risks including including sedation, increased fall risk, dizziness, addictive potential and death.      Opioid agreement  Pain management agreement was reviewed with patient and signed/updated during visit      Drug screen  Drug screen collected during today's visit      PDMP review  [place] or [place]  reviewed. No red flags were identified; safe to proceed with prescription          Chief Complaint   Patient presents with   • Follow-up     tramadol   • other     Gland swollen in neck - ask for antibiotic     History of Present Illness     Opioid Management:   Type of visit: Follow-up    Pain related diagnoses: rheumatoid arthritis    Interval history: Had been on tramadol in the past for left wrist pain/rheumatoid arthritis, but ran out over a year ago due to couldn't come in for appt's (transportation issues)   States he found out she can get transportation for medical appt's through her insurance; today a family member drove her    Also separate c/o Gland in neck swollen past couple weeks and right ear loud noise past few days- throat had been red, but that went away - wonders if needs antibiotics      Screening Tools/Assessments:    PHQ-2/9:  Last PHQ-2 score: 0 (Last PHQ-2 date: 1/22/2024)      Drug Screen:  Last drug screen was performed  "more than 365 days ago    Opioid agreement:  Active Opioid agreement on file?: Yes    Opioid agreement signed date: 6/27/2024  Opioid agreement expiration date: 6/27/2025    Naloxone:  Currently prescribed Naloxone (Narcan): No      High risk medications  High risk meds taken in last 72 hours: none    Outpatient Morphine Milligram Equivalents Per Day       6/26/24 and after 15 MME/Day      Order Name Dose Route Frequency Maximum MME/Day     traMADol (Ultram) 50 mg tablet 50 mg Oral Every 8 hours PRN 15 MME/Day    Total Potential Morphine Milligram Equivalents Per Day 15 MME/Day      Calculation Information          traMADol (Ultram) 50 mg tablet    traMADol 50 mg Tabs: single dose of 50 mg * 3 doses per day * morphine equivalence factor of 0.1 = 15 MME/Day                                   PDMP Review         Value Time User    PDMP Reviewed  Yes 6/26/2024  5:01 PM Tracey Lee DO           Review of Systems  Objective     /76 (BP Location: Left arm, Patient Position: Sitting, Cuff Size: Adult)   Pulse 80   Temp 98 °F (36.7 °C)   Ht 5' 2\" (1.575 m)   Wt 55.3 kg (122 lb)   LMP  (LMP Unknown)   SpO2 98%   BMI 22.31 kg/m²     Physical Exam  Vitals and nursing note reviewed.   Constitutional:       General: She is not in acute distress.     Appearance: She is well-developed and well-groomed. She is not ill-appearing, toxic-appearing or diaphoretic.   HENT:      Head: Normocephalic and atraumatic.      Right Ear: Tympanic membrane and ear canal normal.      Left Ear: Tympanic membrane and ear canal normal.      Nose: Nose normal.      Mouth/Throat:      Lips: Pink.      Mouth: Mucous membranes are moist.      Pharynx: Posterior oropharyngeal erythema (mild left>right) present. No pharyngeal swelling, oropharyngeal exudate or uvula swelling.      Tonsils: No tonsillar exudate. 0 on the right. 0 on the left.   Eyes:      Conjunctiva/sclera: Conjunctivae normal.   Pulmonary:      Effort: Pulmonary effort " is normal.   Musculoskeletal:      Cervical back: Neck supple.   Lymphadenopathy:      Cervical: Cervical adenopathy (left >right shotty) present.   Skin:     Coloration: Skin is not pale.   Neurological:      General: No focal deficit present.      Mental Status: She is alert and oriented to person, place, and time.      Gait: Gait normal.   Psychiatric:         Attention and Perception: Attention normal.         Mood and Affect: Mood normal.         Speech: Speech normal.         Behavior: Behavior normal. Behavior is cooperative.         Cognition and Memory: Cognition and memory normal.         Judgment: Judgment normal.     Administrative Statements

## 2024-07-22 DIAGNOSIS — K21.9 GASTROESOPHAGEAL REFLUX DISEASE WITHOUT ESOPHAGITIS: ICD-10-CM

## 2024-08-07 DIAGNOSIS — M06.9 RHEUMATOID ARTHRITIS OF BOTH WRISTS, UNSPECIFIED WHETHER RHEUMATOID FACTOR PRESENT (HCC): ICD-10-CM

## 2024-08-07 DIAGNOSIS — G89.29 OTHER CHRONIC PAIN: ICD-10-CM

## 2024-08-07 DIAGNOSIS — M25.532 LEFT WRIST PAIN: ICD-10-CM

## 2024-08-08 RX ORDER — TRAMADOL HYDROCHLORIDE 50 MG/1
50 TABLET ORAL EVERY 8 HOURS PRN
Qty: 48 TABLET | Refills: 0 | Status: SHIPPED | OUTPATIENT
Start: 2024-08-08

## 2024-09-02 DIAGNOSIS — G89.29 OTHER CHRONIC PAIN: ICD-10-CM

## 2024-09-02 DIAGNOSIS — M25.532 LEFT WRIST PAIN: ICD-10-CM

## 2024-09-02 DIAGNOSIS — M06.9 RHEUMATOID ARTHRITIS OF BOTH WRISTS, UNSPECIFIED WHETHER RHEUMATOID FACTOR PRESENT (HCC): ICD-10-CM

## 2024-09-03 DIAGNOSIS — M06.9 RHEUMATOID ARTHRITIS OF BOTH WRISTS, UNSPECIFIED WHETHER RHEUMATOID FACTOR PRESENT (HCC): ICD-10-CM

## 2024-09-03 DIAGNOSIS — M25.532 LEFT WRIST PAIN: ICD-10-CM

## 2024-09-03 DIAGNOSIS — G89.29 OTHER CHRONIC PAIN: ICD-10-CM

## 2024-09-03 RX ORDER — TRAMADOL HYDROCHLORIDE 50 MG/1
50 TABLET ORAL EVERY 8 HOURS PRN
Qty: 27 TABLET | Refills: 0 | Status: SHIPPED | OUTPATIENT
Start: 2024-09-03

## 2024-09-03 NOTE — TELEPHONE ENCOUNTER
Reason for call:   [x] Refill   [] Prior Auth  [] Other:     Office:   [x] PCP/Provider - Dr Lee  [] Specialty/Provider -     Medication:   Tramadol 50 mg, 1 q8 prn, 48    Pharmacy:   [facility], [place]    Does the patient have enough for 3 days?   [x] Yes   [] No - Send as HP to POD

## 2024-09-04 RX ORDER — TRAMADOL HYDROCHLORIDE 50 MG/1
50 TABLET ORAL EVERY 8 HOURS PRN
Qty: 48 TABLET | Refills: 0 | OUTPATIENT
Start: 2024-09-04

## 2024-09-04 NOTE — TELEPHONE ENCOUNTER
Tramadol was just refilled yesterday      traMADol (ULTRAM) 50 mg tablet [[id number]]    Order Details  Dose: 50 mg Route: Oral Frequency: Every 8 hours PRN for moderate pain, severe pain   Dispense Quantity: 27 tablet Refills: 0          Sig: Take 1 tablet (50 mg total) by mouth every 8 (eight) hours as needed for moderate pain or severe pain         Start Date: 09/03/24 End Date: --   Written Date: 09/03/24 Expiration Date: 03/02/25       Associated Diagnoses: Rheumatoid arthritis of both wrists, unspecified whether rheumatoid factor present (HCC) [M06.9]; Left wrist pain [M25.532]; Other chronic pain [G89.29]   Original Order: traMADol (ULTRAM) 50 mg tablet [[id number]]   Providers  Authorizing Provider:  Tracey Lee DO  [address] [place] [id number]  Phone: [phone]   Fax: [phone]  DONNA #: [id number]   NPI: [id number]        Ordering User: Tracey Lee DO        [address] [address]  Phone: [phone]  Fax: [phone]  DONNA #: --   Outpatient Morphine Milligram Equivalents Per Day  9/3/24 and after   15 MME/Day  Order Name Dose Route Frequency Maximum MME/Day    traMADol (ULTRAM) 50 mg tablet 50 mg Oral Every 8 hours PRN 15 MME/Day   Total Potential Morphine Milligram Equivalents Per Day 15 MME/Day   Calculation Information        E-Prescribing Status  Outpatient Medication Detail  traMADol (ULTRAM) 50 mg tablet        Sig: Take 1 tablet (50 mg total) by mouth every 8 (eight) hours as needed for moderate pain or severe pain        Sent to pharmacy as: traMADol HCl 50 MG Oral Tablet (ULTRAM)        Class: Normal        Route: Oral        E-Prescribing Status: Receipt confirmed by pharmacy (9/3/2024 12:36 PM EDT)

## 2024-09-17 DIAGNOSIS — G89.29 OTHER CHRONIC PAIN: ICD-10-CM

## 2024-09-17 DIAGNOSIS — M25.532 LEFT WRIST PAIN: ICD-10-CM

## 2024-09-17 DIAGNOSIS — M06.9 RHEUMATOID ARTHRITIS OF BOTH WRISTS, UNSPECIFIED WHETHER RHEUMATOID FACTOR PRESENT (HCC): ICD-10-CM

## 2024-09-18 DIAGNOSIS — M06.9 RHEUMATOID ARTHRITIS OF BOTH WRISTS, UNSPECIFIED WHETHER RHEUMATOID FACTOR PRESENT (HCC): ICD-10-CM

## 2024-09-18 DIAGNOSIS — G89.29 OTHER CHRONIC PAIN: ICD-10-CM

## 2024-09-18 DIAGNOSIS — M25.532 LEFT WRIST PAIN: ICD-10-CM

## 2024-09-20 RX ORDER — TRAMADOL HYDROCHLORIDE 50 MG/1
50 TABLET ORAL EVERY 8 HOURS PRN
Qty: 27 TABLET | Refills: 0 | Status: SHIPPED | OUTPATIENT
Start: 2024-09-20 | End: 2024-09-27

## 2024-09-20 RX ORDER — TRAMADOL HYDROCHLORIDE 50 MG/1
50 TABLET ORAL EVERY 8 HOURS PRN
Qty: 27 TABLET | Refills: 0 | OUTPATIENT
Start: 2024-09-20

## 2024-09-20 NOTE — TELEPHONE ENCOUNTER
The pharmacist, Ed, at [facility] pharmacy called to check the status of the refill request patient's tramadol. He was advised a refill was that his electronic request from 09/17/24 is pending approval. Ed verbalized understanding and is in agreement.      Rerouting with high priority. Please note a duplicate request from the patient has also been routed for review in refill encounter 09/18/24.

## 2024-09-25 DIAGNOSIS — M25.532 LEFT WRIST PAIN: ICD-10-CM

## 2024-09-25 DIAGNOSIS — M06.9 RHEUMATOID ARTHRITIS OF BOTH WRISTS, UNSPECIFIED WHETHER RHEUMATOID FACTOR PRESENT (HCC): ICD-10-CM

## 2024-09-25 DIAGNOSIS — G89.29 OTHER CHRONIC PAIN: ICD-10-CM

## 2024-09-26 DIAGNOSIS — M06.9 RHEUMATOID ARTHRITIS OF BOTH WRISTS, UNSPECIFIED WHETHER RHEUMATOID FACTOR PRESENT (HCC): ICD-10-CM

## 2024-09-26 DIAGNOSIS — G89.29 OTHER CHRONIC PAIN: ICD-10-CM

## 2024-09-26 DIAGNOSIS — M25.532 LEFT WRIST PAIN: ICD-10-CM

## 2024-09-26 NOTE — TELEPHONE ENCOUNTER
Patient called the office to check the medication refill request. Pt was made aware that her request was still pending and someone will give her a call back to further discuss

## 2024-09-27 RX ORDER — TRAMADOL HYDROCHLORIDE 50 MG/1
50 TABLET ORAL EVERY 8 HOURS PRN
Qty: 27 TABLET | Refills: 0 | OUTPATIENT
Start: 2024-09-27

## 2024-09-27 RX ORDER — TRAMADOL HYDROCHLORIDE 50 MG/1
50 TABLET ORAL EVERY 8 HOURS PRN
Qty: 27 TABLET | Refills: 0 | Status: SHIPPED | OUTPATIENT
Start: 2024-09-27

## 2024-09-27 NOTE — TELEPHONE ENCOUNTER
Patient calling to check on status of refill. She gets delivery from her pharmacy and may have to wait until Tuesday if it is not received today. She will be out of medicine by Tuesday. Please advise.

## 2024-10-01 ENCOUNTER — TELEPHONE (OUTPATIENT)
Age: 79
End: 2024-10-01

## 2024-10-01 NOTE — TELEPHONE ENCOUNTER
Needs appt   She canceled her opioid f/u appt yesterday (which was also the appt for her long overdue Medicare AWV)

## 2024-10-01 NOTE — TELEPHONE ENCOUNTER
Pt has scheduled appt on 10/4. Called pt to inform her this will discussed at upcoming appt. Pt understood

## 2024-10-04 ENCOUNTER — OFFICE VISIT (OUTPATIENT)
Dept: FAMILY MEDICINE CLINIC | Facility: CLINIC | Age: 79
End: 2024-10-04
Payer: COMMERCIAL

## 2024-10-04 VITALS
HEART RATE: 82 BPM | TEMPERATURE: 97.7 F | SYSTOLIC BLOOD PRESSURE: 122 MMHG | BODY MASS INDEX: 21.71 KG/M2 | RESPIRATION RATE: 16 BRPM | HEIGHT: 62 IN | DIASTOLIC BLOOD PRESSURE: 82 MMHG | WEIGHT: 118 LBS | OXYGEN SATURATION: 98 %

## 2024-10-04 DIAGNOSIS — Z79.891 OPIOID USE AGREEMENT EXISTS: ICD-10-CM

## 2024-10-04 DIAGNOSIS — Z00.00 MEDICARE ANNUAL WELLNESS VISIT, SUBSEQUENT: Primary | ICD-10-CM

## 2024-10-04 DIAGNOSIS — M06.9 RHEUMATOID ARTHRITIS OF BOTH WRISTS, UNSPECIFIED WHETHER RHEUMATOID FACTOR PRESENT (HCC): ICD-10-CM

## 2024-10-04 DIAGNOSIS — M06.9 RHEUMATOID ARTHRITIS INVOLVING MULTIPLE SITES, UNSPECIFIED WHETHER RHEUMATOID FACTOR PRESENT (HCC): ICD-10-CM

## 2024-10-04 DIAGNOSIS — F11.90 OPIOID USE: ICD-10-CM

## 2024-10-04 DIAGNOSIS — M25.532 LEFT WRIST PAIN: ICD-10-CM

## 2024-10-04 DIAGNOSIS — G89.29 OTHER CHRONIC PAIN: ICD-10-CM

## 2024-10-04 PROCEDURE — G0439 PPPS, SUBSEQ VISIT: HCPCS | Performed by: FAMILY MEDICINE

## 2024-10-04 PROCEDURE — 99214 OFFICE O/P EST MOD 30 MIN: CPT | Performed by: FAMILY MEDICINE

## 2024-10-04 NOTE — PATIENT INSTRUCTIONS
Medicare Preventive Visit Patient Instructions  Thank you for completing your Welcome to Medicare Visit or Medicare Annual Wellness Visit today. Your next wellness visit will be due in one year (10/5/2025).  The screening/preventive services that you may require over the next 5-10 years are detailed below. Some tests may not apply to you based off risk factors and/or age. Screening tests ordered at today's visit but not completed yet may show as past due. Also, please note that scanned in results may not display below.  Preventive Screenings:  Service Recommendations Previous Testing/Comments   Colorectal Cancer Screening  * Colonoscopy    * Fecal Occult Blood Test (FOBT)/Fecal Immunochemical Test (FIT)  * Fecal DNA/Cologuard Test  * Flexible Sigmoidoscopy Age: 45-75 years old   Colonoscopy: every 10 years (may be performed more frequently if at higher risk)  OR  FOBT/FIT: every 1 year  OR  Cologuard: every 3 years  OR  Sigmoidoscopy: every 5 years  Screening may be recommended earlier than age 45 if at higher risk for colorectal cancer. Also, an individualized decision between you and your healthcare provider will decide whether screening between the ages of 76-85 would be appropriate. Colonoscopy: Not on file  FOBT/FIT: Not on file  Cologuard: Not on file  Sigmoidoscopy: Not on file          Breast Cancer Screening Age: 40+ years old  Frequency: every 1-2 years  Not required if history of left and right mastectomy Mammogram: Not on file        Cervical Cancer Screening Between the ages of 21-29, pap smear recommended once every 3 years.   Between the ages of 30-65, can perform pap smear with HPV co-testing every 5 years.   Recommendations may differ for women with a history of total hysterectomy, cervical cancer, or abnormal pap smears in past. Pap Smear: 12/21/2020    Screening Not Indicated   Hepatitis C Screening Once for adults born between 1945 and 1965  More frequently in patients at high risk for Hepatitis  C Hep C Antibody: 05/31/2019    Screening Current   Diabetes Screening 1-2 times per year if you're at risk for diabetes or have pre-diabetes Fasting glucose: 83 mg/dL (9/28/2020)  A1C: No results in last 5 years (No results in last 5 years)      Cholesterol Screening Once every 5 years if you don't have a lipid disorder. May order more often based on risk factors. Lipid panel: 09/28/2020    Screening Not Indicated  History Lipid Disorder     Other Preventive Screenings Covered by Medicare:  Abdominal Aortic Aneurysm (AAA) Screening: covered once if your at risk. You're considered to be at risk if you have a family history of AAA.  Lung Cancer Screening: covers low dose CT scan once per year if you meet all of the following conditions: (1) Age 55-77; (2) No signs or symptoms of lung cancer; (3) Current smoker or have quit smoking within the last 15 years; (4) You have a tobacco smoking history of at least 20 pack years (packs per day multiplied by number of years you smoked); (5) You get a written order from a healthcare provider.  Glaucoma Screening: covered annually if you're considered high risk: (1) You have diabetes OR (2) Family history of glaucoma OR (3)  aged 50 and older OR (4)  American aged 65 and older  Osteoporosis Screening: covered every 2 years if you meet one of the following conditions: (1) You're estrogen deficient and at risk for osteoporosis based off medical history and other findings; (2) Have a vertebral abnormality; (3) On glucocorticoid therapy for more than 3 months; (4) Have primary hyperparathyroidism; (5) On osteoporosis medications and need to assess response to drug therapy.   Last bone density test (DXA Scan): 09/29/2020.  HIV Screening: covered annually if you're between the age of 15-65. Also covered annually if you are younger than 15 and older than 65 with risk factors for HIV infection. For pregnant patients, it is covered up to 3 times per  pregnancy.    Immunizations:  Immunization Recommendations   Influenza Vaccine Annual influenza vaccination during flu season is recommended for all persons aged >= 6 months who do not have contraindications   Pneumococcal Vaccine   * Pneumococcal conjugate vaccine = PCV13 (Prevnar 13), PCV15 (Vaxneuvance), PCV20 (Prevnar 20)  * Pneumococcal polysaccharide vaccine = PPSV23 (Pneumovax) Adults 19-65 yo with certain risk factors or if 65+ yo  If never received any pneumonia vaccine: recommend Prevnar 20 (PCV20)  Give PCV20 if previously received 1 dose of PCV13 or PPSV23   Hepatitis B Vaccine 3 dose series if at intermediate or high risk (ex: diabetes, end stage renal disease, liver disease)   Respiratory syncytial virus (RSV) Vaccine - COVERED BY MEDICARE PART D  * RSVPreF3 (Arexvy) CDC recommends that adults 60 years of age and older may receive a single dose of RSV vaccine using shared clinical decision-making (SCDM)   Tetanus (Td) Vaccine - COST NOT COVERED BY MEDICARE PART B Following completion of primary series, a booster dose should be given every 10 years to maintain immunity against tetanus. Td may also be given as tetanus wound prophylaxis.   Tdap Vaccine - COST NOT COVERED BY MEDICARE PART B Recommended at least once for all adults. For pregnant patients, recommended with each pregnancy.   Shingles Vaccine (Shingrix) - COST NOT COVERED BY MEDICARE PART B  2 shot series recommended in those 19 years and older who have or will have weakened immune systems or those 50 years and older     Health Maintenance Due:      Topic Date Due   • Breast Cancer Screening: Mammogram  Never done   • Hepatitis C Screening  Completed   • Colorectal Cancer Screening  Discontinued     Immunizations Due:      Topic Date Due   • Pneumococcal Vaccine: 65+ Years (1 of 1 - PCV) Never done   • Influenza Vaccine (1) Never done   • COVID-19 Vaccine (1 - 2023-24 season) Never done     Advance Directives   What are advance directives?   Advance directives are legal documents that state your wishes and plans for medical care. These plans are made ahead of time in case you lose your ability to make decisions for yourself. Advance directives can apply to any medical decision, such as the treatments you want, and if you want to donate organs.   What are the types of advance directives?  There are many types of advance directives, and each state has rules about how to use them. You may choose a combination of any of the following:  Living will:  This is a written record of the treatment you want. You can also choose which treatments you do not want, which to limit, and which to stop at a certain time. This includes surgery, medicine, IV fluid, and tube feedings.   Durable power of  for healthcare (DPAHC):  This is a written record that states who you want to make healthcare choices for you when you are unable to make them for yourself. This person, called a proxy, is usually a family member or a friend. You may choose more than 1 proxy.  Do not resuscitate (DNR) order:  A DNR order is used in case your heart stops beating or you stop breathing. It is a request not to have certain forms of treatment, such as CPR. A DNR order may be included in other types of advance directives.  Medical directive:  This covers the care that you want if you are in a coma, near death, or unable to make decisions for yourself. You can list the treatments you want for each condition. Treatment may include pain medicine, surgery, blood transfusions, dialysis, IV or tube feedings, and a ventilator (breathing machine).  Values history:  This document has questions about your views, beliefs, and how you feel and think about life. This information can help others choose the care that you would choose.  Why are advance directives important?  An advance directive helps you control your care. Although spoken wishes may be used, it is better to have your wishes written down.  Spoken wishes can be misunderstood, or not followed. Treatments may be given even if you do not want them. An advance directive may make it easier for your family to make difficult choices about your care.       © Copyright [employer] 2018 Information is for End User's use only and may not be sold, redistributed or otherwise used for commercial purposes. All illustrations and images included in CareNotes® are the copyrighted property of [employer].D.A[employer]M., Inc. or [employer]

## 2024-10-04 NOTE — PROGRESS NOTES
Ambulatory Visit  Name: Blanca Brewer      : 1945      MRN: [id number]  Encounter Provider: Tracey Lee DO  Encounter Date: 10/4/2024   Encounter department: FAMILY PRACTICE AT [facility]  Medicare annual wellness visit, subsequent         Opioid use         Rheumatoid arthritis of both wrists, unspecified whether rheumatoid factor present (HCC)         Other chronic pain    Orders:    Ambulatory Referral to Social Work Care Management Program; Future    Left wrist pain    Orders:    Ambulatory Referral to Social Work Care Management Program; Future    Opioid use agreement exists         Rheumatoid arthritis involving multiple sites, unspecified whether rheumatoid factor present (HCC)    Orders:    Ambulatory Referral to Social Work Care Management Program; Future       Preventive health issues were discussed with patient, and age appropriate screening tests were ordered as noted in patient's After Visit Summary. Personalized health advice and appropriate referrals for health education or preventive services given if needed, as noted in patient's After Visit Summary.    History of Present Illness     Medicare AWV + opioid f/u visit  States in July she was dropped from Medicaid, now on Blue [employer], and is on a low-income program that allows her to get the Tramadol at low cost just under $5 for a month's supply of 240 tablets; states because she was dropped from Medicaid, she lost the transportation assistance through [employer], but then found out that [employer] has a transportation program. Transportation was an issue in the past, leading to her not being able to get in to appt's here for opioid f/u visit, and she had been out of the Tramadol for all of  and half of this year       Patient Care Team:  Tracey Lee DO as PCP - General  Tracey Lee DO as PCP - [facility]CP-[facility] ([facility])    Review of Systems  Medical History Reviewed by provider this  encounter:       Annual Wellness Visit Questionnaire   Blanca is here for her Subsequent Wellness visit. Last Medicare Wellness visit information reviewed, patient interviewed and updates made to the record today.      Health Risk Assessment:   Patient rates overall health as very good. Patient feels that their physical health rating is slightly better. Patient is very satisfied with their life. Eyesight was rated as same. Hearing was rated as same. Patient feels that their emotional and mental health rating is much better. Patients states they are never, rarely angry. Patient states they are sometimes unusually tired/fatigued. Pain experienced in the last 7 days has been some. Patient's pain rating has been 5/10. Patient states that she has experienced no weight loss or gain in last 6 months.     Fall Risk Screening:   In the past year, patient has experienced: no history of falling in past year      Urinary Incontinence Screening:   Patient has not leaked urine accidently in the last six months.     Home Safety:  Patient does not have trouble with stairs inside or outside of their home. Patient has working smoke alarms and has working carbon monoxide detector. Home safety hazards include: none.     Nutrition:   Current diet is Regular.     Medications:   Patient is currently taking over-the-counter supplements. OTC medications include: see medication list. Patient is able to manage medications.     Activities of Daily Living (ADLs)/Instrumental Activities of Daily Living (IADLs):   Walk and transfer into and out of bed and chair?: Yes  Dress and groom yourself?: Yes    Bathe or shower yourself?: Yes    Feed yourself? Yes  Do your laundry/housekeeping?: Yes  Manage your money, pay your bills and track your expenses?: Yes  Make your own meals?: Yes    Do your own shopping?: Yes    Previous Hospitalizations:   Any hospitalizations or ED visits within the last 12 months?: No      Advance Care Planning:     Advanced  directive: No    ACP document given: Yes      Cognitive Screening:   Provider or family/friend/caregiver concerned regarding cognition?: No    PREVENTIVE SCREENINGS      Cardiovascular Screening:    General: Screening Not Indicated and History Lipid Disorder      Diabetes Screening:     General: Patient Declines      Colorectal Cancer Screening:     General: Patient Declines      Breast Cancer Screening:     General: Patient Declines      Cervical Cancer Screening:    General: Screening Not Indicated      Osteoporosis Screening:    General: Patient Declines      Abdominal Aortic Aneurysm (AAA) Screening:        General: Screening Not Indicated      Lung Cancer Screening:     General: Screening Not Indicated      Hepatitis C Screening:    General: Screening Current    Screening, Brief Intervention, and Referral to Treatment (SBIRT)    Screening  Typical number of drinks in a day: 0  Typical number of drinks in a week: 0  Interpretation: Low risk drinking behavior.    Single Item Drug Screening:  How often have you used an illegal drug (including marijuana) or a prescription medication for non-medical reasons in the past year? never    Single Item Drug Screen Score: 0  Interpretation: Negative screen for possible drug use disorder    SDOH Risk Assessment  Social determinants of health (SDOH) risk assesment tool was completed. The tool at a minimum covered housing stability, food insecurity, transportation needs, and utility difficulty. Patient had at risk responses for the following SDOH domains: food insecurity and housing stability.     Social Determinants of Health     Financial Resource Strain: Low Risk  (1/22/2024)    Overall Financial Resource Strain (CARDIA)     Difficulty of Paying Living Expenses: Not hard at all   Food Insecurity: Food Insecurity Present (10/4/2024)    Hunger Vital Sign     Worried About Running Out of Food in the Last Year: Often true     Ran Out of Food in the Last Year: Often true  "  Transportation Needs: No Transportation Needs (10/4/2024)    PRAPARE - Transportation     Lack of Transportation (Medical): No     Lack of Transportation (Non-Medical): No   Housing Stability: High Risk (10/4/2024)    Housing Stability Vital Sign     Unable to Pay for Housing in the Last Year: Yes     Number of Times Moved in the Last Year: 0     Homeless in the Last Year: No   Utilities: Not At Risk (10/4/2024)    [facility] Utilities     Threatened with loss of utilities: No     No results found.    Objective     /82 (BP Location: Left arm, Patient Position: Sitting, Cuff Size: Standard)   Pulse 82   Temp 97.7 °F (36.5 °C) (Tympanic)   Resp 16   Ht 5' 2\" (1.575 m)   Wt 53.5 kg (118 lb)   LMP  (LMP Unknown)   SpO2 98%   BMI 21.58 kg/m²     Physical Exam  Vitals and nursing note reviewed.   Constitutional:       General: She is not in acute distress.     Appearance: She is well-developed and well-groomed. She is not ill-appearing, toxic-appearing or diaphoretic.      Comments: Appears much younger than stated age, vibrant as ever   HENT:      Head: Normocephalic and atraumatic.      Nose: Nose normal.      Mouth/Throat:      Mouth: Mucous membranes are moist.   Eyes:      Conjunctiva/sclera: Conjunctivae normal.   Neck:      Thyroid: No thyroid mass, thyromegaly or thyroid tenderness.      Trachea: Trachea and phonation normal.   Cardiovascular:      Rate and Rhythm: Normal rate and regular rhythm.      Pulses: Normal pulses.      Heart sounds: Murmur (unchanged) heard.      No friction rub. No gallop.   Pulmonary:      Effort: Pulmonary effort is normal.      Breath sounds: Normal breath sounds and air entry.   Abdominal:      Palpations: Abdomen is soft. There is no hepatomegaly, splenomegaly or mass.      Tenderness: There is no abdominal tenderness.   Musculoskeletal:      Left wrist: Deformity (unchanged) present.      Cervical back: Neck supple.      Right lower leg: No edema.      Left lower leg: No " edema.   Lymphadenopathy:      Cervical: No cervical adenopathy.   Skin:     General: Skin is warm and dry.      Coloration: Skin is not pale.   Neurological:      General: No focal deficit present.      Mental Status: She is alert and oriented to person, place, and time.      Gait: Gait normal.   Psychiatric:         Mood and Affect: Mood normal.         Behavior: Behavior normal. Behavior is cooperative.         Cognition and Memory: Cognition and memory normal.         Judgment: Judgment normal.

## 2024-10-06 RX ORDER — TRAMADOL HYDROCHLORIDE 50 MG/1
50 TABLET ORAL EVERY 8 HOURS PRN
Qty: 240 TABLET | Refills: 0 | Status: SHIPPED | OUTPATIENT
Start: 2024-10-06

## 2024-10-06 NOTE — ASSESSMENT & PLAN NOTE
Orders:    traMADol (ULTRAM) 50 mg tablet; Take 1 tablet (50 mg total) by mouth every 8 (eight) hours as needed for moderate pain or severe pain

## 2024-10-06 NOTE — PROGRESS NOTES
Ambulatory Visit  Name: Blanca Brewer      : 1945      MRN: [id number]  Encounter Provider: Tracey Lee DO  Encounter Date: 10/4/2024   Encounter department: FAMILY PRACTICE AT [place]    Assessment & Plan  Medicare annual wellness visit, subsequent         Opioid use         Rheumatoid arthritis of both wrists, unspecified whether rheumatoid factor present (HCC)    Orders:    traMADol (ULTRAM) 50 mg tablet; Take 1 tablet (50 mg total) by mouth every 8 (eight) hours as needed for moderate pain or severe pain    Other chronic pain    Orders:    Ambulatory Referral to Social Work Care Management Program; Future    traMADol (ULTRAM) 50 mg tablet; Take 1 tablet (50 mg total) by mouth every 8 (eight) hours as needed for moderate pain or severe pain    Left wrist pain    Orders:    Ambulatory Referral to Social Work Care Management Program; Future    traMADol (ULTRAM) 50 mg tablet; Take 1 tablet (50 mg total) by mouth every 8 (eight) hours as needed for moderate pain or severe pain    Opioid use agreement exists         Rheumatoid arthritis involving multiple sites, unspecified whether rheumatoid factor present (HCC)    Orders:    Ambulatory Referral to Social Work Care Management Program; Future    Treatment Plan: cpm    Treatment Goals: Maintain/improve quality of life, relieve pain    Opiate risks  There are risks associated with opioid medications, including dependence, addiction and tolerance. The patient understands and agrees to use these medications only as prescribed. Potential side effects of the medications include, but are not limited to, constipation, drowsiness, addiction, impaired judgment and risk of fatal overdose if not taken as prescribed. The patient was warned against driving while taking sedation medications.  Sharing medications is a felony. At this point in time, the patient is showing no signs of addiction, abuse, diversion or suicidal ideation.      PDMP review  [place] PDMP or [place]  " reviewed. No red flags were identified; safe to proceed with prescription            History of Present Illness     Opioid Management:   Type of visit: Follow-up    Pain related diagnoses: rheumatoid arthritis    Interval history: Doing well  Per HPI, was dropped from Medicaid secondary insurance, but has found a low-cost program for tramadol rx    Aberrant behavior?: No      Adverse effects from medication?: No      Screening Tools/Assessments:    PHQ-2/9:  Last PHQ-2 score: 0 (Last PHQ-2 date: 1/22/2024)      Drug Screen:  Date of last drug screen: 6/26/2024  Drug screen result based off current prescriptions: consistent    Opioid agreement:  Active Opioid agreement on file?: Yes    Opioid agreement signed date: 6/27/2024  Opioid agreement expiration date: 6/27/2025    Naloxone:  Currently prescribed Naloxone (Narcan): No      Outpatient Morphine Milligram Equivalents Per Day       10/6/24 and after 15 MME/Day      Order Name Dose Route Frequency Maximum MME/Day     traMADol (ULTRAM) 50 mg tablet 50 mg Oral Every 8 hours PRN 15 MME/Day    Total Potential Morphine Milligram Equivalents Per Day 15 MME/Day      Calculation Information          traMADol (ULTRAM) 50 mg tablet    traMADol 50 mg Tabs: single dose of 50 mg * 3 doses per day * morphine equivalence factor of 0.1 = 15 MME/Day                                 PDMP Review         Value Time User    PDMP Reviewed  Yes 10/6/2024  9:44 AM Tracey Lee DO           Review of Systems  Objective     /82 (BP Location: Left arm, Patient Position: Sitting, Cuff Size: Standard)   Pulse 82   Temp 97.7 °F (36.5 °C) (Tympanic)   Resp 16   Ht 5' 2\" (1.575 m)   Wt 53.5 kg (118 lb)   LMP  (LMP Unknown)   SpO2 98%   BMI 21.58 kg/m²     Physical Exam            "

## 2024-10-06 NOTE — ASSESSMENT & PLAN NOTE
Orders:    Ambulatory Referral to Social Work Care Management Program; Future    traMADol (ULTRAM) 50 mg tablet; Take 1 tablet (50 mg total) by mouth every 8 (eight) hours as needed for moderate pain or severe pain

## 2024-10-07 ENCOUNTER — PATIENT OUTREACH (OUTPATIENT)
Dept: CASE MANAGEMENT | Facility: OTHER | Age: 79
End: 2024-10-07

## 2024-10-07 NOTE — PROGRESS NOTES
Referral received from PCP for Outpatient Social Work Care Manager (OP SWCM) to outreach patient and assist with insurance questions (why patient is no longer receiving Medicaid coverage).  Per chart review, patient also has SDOH needs noted - housing and food insecurities.  Patient has AARP Medicare and lives in [place]).    Call placed to patient to introduce role of OP SWCM and assess needs.  No answer and unable to leave voicemail as mailbox is full.    Will attempt additional outreach at later date.

## 2024-10-10 ENCOUNTER — PATIENT OUTREACH (OUTPATIENT)
Dept: CASE MANAGEMENT | Facility: OTHER | Age: 79
End: 2024-10-10

## 2024-10-10 NOTE — LETTER
[address]  [place] [address]  [phone]   Re: Unable to Reach   10/10/2024       Dear Blanca,    I am a [facility]  and wanted to be certain you had information to contact me should you desire assistance with or have questions about non-medical aspects of your care such as [but not limited to] medical insurance, housing, transportation, material needs, or emergency needs, as I was unable to reach you.  If I do not have an answer I will assist you in finding the appropriate agency or individual who can help.      Please feel free to contact me at [phone]. Thank You.    Sincerely,         Vero Bautista

## 2024-10-10 NOTE — PROGRESS NOTES
2nd outreach attempt to patient to assist with insurance questions (why patient is no longer receiving Medicaid coverage).  Per chart review, patient also has SDOH needs noted - housing and food insecurities.    Patient has AARP Medicare and lives in [place]).    No answer and unable to leave voicemail as mailbox is full.    Will send Unable to Reach letter to patient via [employer] and close case at this time.

## 2024-11-25 DIAGNOSIS — K21.9 GASTROESOPHAGEAL REFLUX DISEASE WITHOUT ESOPHAGITIS: ICD-10-CM

## 2024-11-25 NOTE — TELEPHONE ENCOUNTER
Pt called requesting the following medication. Pt also reported that she only has 2 pills left.    Medication:   esomeprazole (NexIUM) 20 mg capsule     Dose/Frequency: Take 1 capsule (20 mg total) by mouth daily     Quantity: 90 capsule     Pharmacy: [facility] [place] [address] [address]  Phone: [phone]  Fax: [phone]     Office:   [x] PCP/Provider - Tracey Lee, DO   [] Speciality/Provider -     Does the patient have enough for 3 days?   [] Yes   [x] No - Send as HP to POD

## 2024-12-13 ENCOUNTER — RA CDI HCC (OUTPATIENT)
Dept: OTHER | Facility: HOSPITAL | Age: 79
End: 2024-12-13

## 2024-12-27 DIAGNOSIS — M25.532 LEFT WRIST PAIN: ICD-10-CM

## 2024-12-27 DIAGNOSIS — G89.29 OTHER CHRONIC PAIN: ICD-10-CM

## 2024-12-27 DIAGNOSIS — M06.9 RHEUMATOID ARTHRITIS OF BOTH WRISTS, UNSPECIFIED WHETHER RHEUMATOID FACTOR PRESENT (HCC): ICD-10-CM

## 2024-12-30 RX ORDER — TRAMADOL HYDROCHLORIDE 50 MG/1
50 TABLET ORAL EVERY 8 HOURS PRN
Qty: 90 TABLET | Refills: 0 | Status: SHIPPED | OUTPATIENT
Start: 2024-12-30

## 2025-03-11 ENCOUNTER — TELEPHONE (OUTPATIENT)
Dept: FAMILY MEDICINE CLINIC | Facility: CLINIC | Age: 80
End: 2025-03-11

## 2025-03-11 NOTE — TELEPHONE ENCOUNTER
Received fax from [facility] regarding PA for patients esomeprazole (NexIUM) 20 mg capsule. Form scanned into media. Please advise and complete.    Key: [id number]

## 2025-03-11 NOTE — TELEPHONE ENCOUNTER
[facility] for [facility]razole 20 [facility]     via    [x]CMM-KEY: [id number]  []Surescripts-Case ID #   []Availity-Auth ID # NDC #   []Faxed to plan   []Other website   []Phone call Case ID #     []PA sent as URGENT    All office notes, labs and other pertaining documents and studies sent. Clinical questions answered. Awaiting determination from insurance company.     Turnaround time for your insurance to make a decision on your Prior Authorization can take 7-21 business days.

## 2025-03-12 NOTE — TELEPHONE ENCOUNTER
Called [facility] to follow up on PA and spoke to Mary who stated that the PA was processed and sent to the Clinical department, because they are getting a rejection stating that there  needs to be step therapy, showing that the pt has tried and failed other medication, the Clinical team will be faxing over a form that needs to be filled out and faxed back. I provided the PA Fax number and will wait for the fax.

## 2025-03-17 ENCOUNTER — TELEPHONE (OUTPATIENT)
Age: 80
End: 2025-03-17

## 2025-03-17 NOTE — TELEPHONE ENCOUNTER
Called patient and was unable to lv vm. If patient calls back please give her the number to billing which is [phone].

## 2025-03-17 NOTE — TELEPHONE ENCOUNTER
PA for Esomeprazole 20mg DENIED    Reason:        Message sent to office clinical pool Yes    Denial letter scanned into Media Yes    Appeal started No     **Please follow up with your patient regarding denial and next steps**

## 2025-03-17 NOTE — TELEPHONE ENCOUNTER
"Patient would like someone from billing to send her a \"breakdown\" of her bill from 11/15/24. States she owes #186.86 and she states she should not owe anything. Please f/u with patient.   "

## 2025-03-28 ENCOUNTER — TELEPHONE (OUTPATIENT)
Age: 80
End: 2025-03-28

## 2025-03-28 DIAGNOSIS — M25.532 LEFT WRIST PAIN: ICD-10-CM

## 2025-03-28 DIAGNOSIS — M06.9 RHEUMATOID ARTHRITIS OF BOTH WRISTS, UNSPECIFIED WHETHER RHEUMATOID FACTOR PRESENT (HCC): ICD-10-CM

## 2025-03-28 DIAGNOSIS — K21.9 GASTROESOPHAGEAL REFLUX DISEASE WITHOUT ESOPHAGITIS: Primary | ICD-10-CM

## 2025-03-28 DIAGNOSIS — K21.9 GASTROESOPHAGEAL REFLUX DISEASE WITHOUT ESOPHAGITIS: ICD-10-CM

## 2025-03-28 DIAGNOSIS — G89.29 OTHER CHRONIC PAIN: ICD-10-CM

## 2025-03-28 RX ORDER — TRAMADOL HYDROCHLORIDE 50 MG/1
50 TABLET ORAL EVERY 8 HOURS PRN
Qty: 7 TABLET | Refills: 0 | Status: SHIPPED | OUTPATIENT
Start: 2025-03-28

## 2025-03-28 RX ORDER — PANTOPRAZOLE SODIUM 20 MG/1
20 TABLET, DELAYED RELEASE ORAL DAILY
Qty: 90 TABLET | Refills: 0 | Status: SHIPPED | OUTPATIENT
Start: 2025-03-28

## 2025-03-28 RX ORDER — PANTOPRAZOLE SODIUM 20 MG/1
20 TABLET, DELAYED RELEASE ORAL DAILY
Qty: 90 TABLET | Refills: 0 | Status: SHIPPED | OUTPATIENT
Start: 2025-03-28 | End: 2025-03-28 | Stop reason: SDUPTHER

## 2025-03-28 NOTE — TELEPHONE ENCOUNTER
Please let pt know that due to insurance formulary, we had to change her esomeprazole to pantoprazole.  Also, I am giving one courtesy refill of the Tramadol, but pt needs to be seen for opioid f/u appt ASAP - was due in January, but she canceled that appt. No further refills after this one until seen for opioid f/u

## 2025-03-28 NOTE — TELEPHONE ENCOUNTER
Yordan from [facility] stated that in regards to esomeprazole magnesium 20 mg the patient is required to go for step therapy and for PCP to help patient get a pre-requisite drug. Yordan also stated that the patient needs a refill for Tramadol 50 mg and that the script needs to be changed for 7 tablets. It can only be written for 7 at a time. Patient has run out of this medication. Please send scripts to [facility]'s pharmacy. Please advise.

## 2025-03-28 NOTE — TELEPHONE ENCOUNTER
Patient returned call. I advised her of message from Dr Lee. Patient became upset and stated she is still waiting for a response from a charge of $186.86 bill she received from her last appointment (10/04/24). She would like a Dr Lee to provide a breakdown of what that bill is for as she doesn't understand why she received this bill. I advised it was for AWV + whatever else was discussed at appointment. Patient stated she wants to see a list of the breakdown. She stated she is not coming in to the office; her co pay is $20 and she cannot afford that on a fixed income.

## 2025-03-31 ENCOUNTER — TELEPHONE (OUTPATIENT)
Dept: FAMILY MEDICINE CLINIC | Facility: CLINIC | Age: 80
End: 2025-03-31

## 2025-03-31 NOTE — TELEPHONE ENCOUNTER
Received Rx clarification request from [facility] pharmacy regarding pt's Tramadol. Placed in provider's bin for completion.

## 2025-04-02 ENCOUNTER — TELEPHONE (OUTPATIENT)
Age: 80
End: 2025-04-02

## 2025-04-02 DIAGNOSIS — G89.29 OTHER CHRONIC PAIN: ICD-10-CM

## 2025-04-02 DIAGNOSIS — M06.9 RHEUMATOID ARTHRITIS OF BOTH WRISTS, UNSPECIFIED WHETHER RHEUMATOID FACTOR PRESENT (HCC): ICD-10-CM

## 2025-04-02 DIAGNOSIS — M25.532 LEFT WRIST PAIN: ICD-10-CM

## 2025-04-02 RX ORDER — TRAMADOL HYDROCHLORIDE 50 MG/1
50 TABLET ORAL EVERY 8 HOURS PRN
Qty: 7 TABLET | Refills: 0 | OUTPATIENT
Start: 2025-04-02

## 2025-04-02 NOTE — TELEPHONE ENCOUNTER
Spoke with both access center representatives. Informed them to tell the pharmacy both times that we received this paper on Monday and it has not yet been completed by the provider. Provider was in with a patient at the time of the call and is now as well. Unable to get clarification at this time. Will forward to provider so that she is aware med still on hold awaiting response.

## 2025-04-02 NOTE — TELEPHONE ENCOUNTER
Called patient and tried to leave  to advise her that she needs to be seen for an opioid f/u ASAP as it was due in January, and she already received a one time courtesy refill was filled 03/28/2025. If patient calls back she will be advised that her will need to have that appointment in order for her refills to be filled.

## 2025-04-02 NOTE — TELEPHONE ENCOUNTER
Received completed form from provider and faxed to [facility]. Fax confirmation received.

## 2025-04-02 NOTE — TELEPHONE ENCOUNTER
Medication:     traMADol (ULTRAM) 50 mg tablet       Dose/Frequency: Take 1 tablet (50 mg total) by mouth every 8 (eight) hours as needed for moderate pain or severe pain     Quantity: : 7 tablet     Pharmacy: [facility] [place] [address] [phone]     Office:   [x] PCP/Provider -   [] Speciality/Provider -     Does the patient have enough for 3 days?   [x] Yes   [] No - Send as HP to POD       Please advise patient at [phone], if any further questions.

## 2025-04-02 NOTE — TELEPHONE ENCOUNTER
Form faxed to [facility] pharmacy (see other encounter). Attempted to call pt to see if bechtels refill still needed. Pt did not answer and unable to leave VM as mailbox full. Please transfer to office if pt returns call.

## 2025-04-02 NOTE — TELEPHONE ENCOUNTER
Nikolas from [facility] called regarding patient's prescription for Tramadol. Wants to know if it's okay to fill due to patient having allergy to codeine. Warm transfer to [place]. Advised that Rx clarification was received; just waiting for provider to complete. Call dropped after information was given. Attempted to call back but was an automated number.

## 2025-04-02 NOTE — TELEPHONE ENCOUNTER
One-time courtesy refill was given 03/28/2025    Me to [facility] Clinical     3/28/25 11:05 AM  Note     I am giving one courtesy refill of the Tramadol, but pt needs to be seen for opioid f/u appt ASAP - was due in January, but she canceled that appt. No further refills after this one until seen for opioid f/u

## 2025-04-02 NOTE — TELEPHONE ENCOUNTER
Lida calls back stating that the previous call had been disconnected. Lida states that the prescription for the Tramadol is on hold until they hear from Dr. Lee regarding this prescription. Patient is noted to have an Allergy or sensitivity to Codeine. The pharmacy needs a clarification for the prescription for the Tramadol. The call back number is [phone] option #2. The reference number for this call is [id number].

## 2025-04-04 NOTE — TELEPHONE ENCOUNTER
Patient returned call and was provided with above message. Patient stated she will not be coming into the office untl the bill for $186 on her account is dicussed with her and taken care of. Patient stated she only has a $5 copay and patient stated she is not sure why she received the bill. Please advise.

## 2025-04-04 NOTE — TELEPHONE ENCOUNTER
Balance Due $186.86 is applied to the deductible for date of service 06/26/24 Medical Assistance denied as patient ineligible.    Called patient but was unable to leave message due to voicemail being full